# Patient Record
Sex: FEMALE | Race: WHITE | NOT HISPANIC OR LATINO | Employment: OTHER | URBAN - METROPOLITAN AREA
[De-identification: names, ages, dates, MRNs, and addresses within clinical notes are randomized per-mention and may not be internally consistent; named-entity substitution may affect disease eponyms.]

---

## 2017-07-18 ENCOUNTER — LAB VISIT (OUTPATIENT)
Dept: LAB | Facility: HOSPITAL | Age: 62
End: 2017-07-18
Attending: INTERNAL MEDICINE
Payer: COMMERCIAL

## 2017-07-18 DIAGNOSIS — C50.919 MALIGNANT NEOPLASM OF FEMALE BREAST: ICD-10-CM

## 2017-07-18 LAB
ALBUMIN SERPL BCP-MCNC: 4.2 G/DL
ALP SERPL-CCNC: 59 U/L
ALT SERPL W/O P-5'-P-CCNC: 34 U/L
ANION GAP SERPL CALC-SCNC: 10 MMOL/L
AST SERPL-CCNC: 31 U/L
BASOPHILS # BLD AUTO: 0.03 K/UL
BASOPHILS NFR BLD: 0.4 %
BILIRUB SERPL-MCNC: 0.7 MG/DL
BUN SERPL-MCNC: 20 MG/DL
CALCIUM SERPL-MCNC: 10 MG/DL
CHLORIDE SERPL-SCNC: 106 MMOL/L
CO2 SERPL-SCNC: 26 MMOL/L
CREAT SERPL-MCNC: 0.75 MG/DL
DIFFERENTIAL METHOD: NORMAL
EOSINOPHIL # BLD AUTO: 0.2 K/UL
EOSINOPHIL NFR BLD: 2.6 %
ERYTHROCYTE [DISTWIDTH] IN BLOOD BY AUTOMATED COUNT: 13.5 %
EST. GFR  (AFRICAN AMERICAN): >60 ML/MIN/1.73 M^2
EST. GFR  (NON AFRICAN AMERICAN): >60 ML/MIN/1.73 M^2
GLUCOSE SERPL-MCNC: 126 MG/DL
HCT VFR BLD AUTO: 42.6 %
HGB BLD-MCNC: 14.2 G/DL
LYMPHOCYTES # BLD AUTO: 2.4 K/UL
LYMPHOCYTES NFR BLD: 31.4 %
MAGNESIUM SERPL-MCNC: 2 MG/DL
MCH RBC QN AUTO: 31 PG
MCHC RBC AUTO-ENTMCNC: 33.3 %
MCV RBC AUTO: 93 FL
MONOCYTES # BLD AUTO: 0.7 K/UL
MONOCYTES NFR BLD: 8.4 %
NEUTROPHILS # BLD AUTO: 4.4 K/UL
NEUTROPHILS NFR BLD: 57.2 %
NRBC BLD-RTO: 0 /100 WBC
PLATELET # BLD AUTO: 245 K/UL
PMV BLD AUTO: 10 FL
POTASSIUM SERPL-SCNC: 4.1 MMOL/L
PROT SERPL-MCNC: 7.1 G/DL
RBC # BLD AUTO: 4.58 M/UL
SODIUM SERPL-SCNC: 142 MMOL/L
WBC # BLD AUTO: 7.73 K/UL

## 2017-07-18 PROCEDURE — 80053 COMPREHEN METABOLIC PANEL: CPT | Mod: PN

## 2017-07-18 PROCEDURE — 85025 COMPLETE CBC W/AUTO DIFF WBC: CPT | Mod: PN

## 2017-07-18 PROCEDURE — 85025 COMPLETE CBC W/AUTO DIFF WBC: CPT

## 2017-07-18 PROCEDURE — 83735 ASSAY OF MAGNESIUM: CPT | Mod: PN

## 2017-07-18 PROCEDURE — 83735 ASSAY OF MAGNESIUM: CPT

## 2017-07-18 PROCEDURE — 80053 COMPREHEN METABOLIC PANEL: CPT

## 2017-07-18 PROCEDURE — 36415 COLL VENOUS BLD VENIPUNCTURE: CPT | Mod: PN

## 2017-08-01 ENCOUNTER — OFFICE VISIT (OUTPATIENT)
Dept: OPTOMETRY | Facility: CLINIC | Age: 62
End: 2017-08-01
Payer: COMMERCIAL

## 2017-08-01 DIAGNOSIS — Z46.0 CONTACT LENS/GLASSES FITTING: ICD-10-CM

## 2017-08-01 DIAGNOSIS — H52.13 MYOPIA WITH ASTIGMATISM AND PRESBYOPIA, BILATERAL: ICD-10-CM

## 2017-08-01 DIAGNOSIS — Z46.0 CONTACT LENS/GLASSES FITTING: Primary | ICD-10-CM

## 2017-08-01 DIAGNOSIS — G43.109 OCULAR MIGRAINE: ICD-10-CM

## 2017-08-01 DIAGNOSIS — Z13.5 GLAUCOMA SCREENING: ICD-10-CM

## 2017-08-01 DIAGNOSIS — H43.393 VITREOUS FLOATERS, BILATERAL: ICD-10-CM

## 2017-08-01 DIAGNOSIS — H52.203 MYOPIA WITH ASTIGMATISM AND PRESBYOPIA, BILATERAL: ICD-10-CM

## 2017-08-01 DIAGNOSIS — H52.4 MYOPIA WITH ASTIGMATISM AND PRESBYOPIA, BILATERAL: ICD-10-CM

## 2017-08-01 DIAGNOSIS — H17.9 CORNEAL SCAR, LEFT EYE: ICD-10-CM

## 2017-08-01 DIAGNOSIS — E11.9 DIABETES MELLITUS TYPE 2 WITHOUT RETINOPATHY: Primary | ICD-10-CM

## 2017-08-01 PROCEDURE — 99499 UNLISTED E&M SERVICE: CPT | Mod: S$GLB,,, | Performed by: OPTOMETRIST

## 2017-08-01 PROCEDURE — 92310 CONTACT LENS FITTING OU: CPT | Mod: ,,, | Performed by: OPTOMETRIST

## 2017-08-01 PROCEDURE — 99999 PR PBB SHADOW E&M-EST. PATIENT-LVL II: CPT | Mod: PBBFAC,,, | Performed by: OPTOMETRIST

## 2017-08-01 PROCEDURE — 92015 DETERMINE REFRACTIVE STATE: CPT | Mod: S$GLB,,, | Performed by: OPTOMETRIST

## 2017-08-01 PROCEDURE — 92014 COMPRE OPH EXAM EST PT 1/>: CPT | Mod: S$GLB,,, | Performed by: OPTOMETRIST

## 2017-08-01 NOTE — PATIENT INSTRUCTIONS
DIABETES AND THE EYE / DIABETIC RETINOPATHY    Diabetic retinopathy is a condition occurring in persons with diabetes, which causes progressive damage to the retina, the light sensitive lining at the back of the eye. It is a serious sight-threatening complication of diabetes.    Diabetic retinopathy is the result of damage to the tiny blood vessels that nourish the retina. They leak blood and other fluids that cause swelling of retinal tissue and clouding of vision. The condition usually affects both eyes. The longer a person has diabetes, the more likely they will develop diabetic retinopathy. If left untreated, diabetic retinopathy can cause blindness.  There are two basic types of diabetic retinopathy:    Background or nonproliferative diabetic retinopathy (NPDR)  Nonproliferative diabetic retinopathy (NPDR) is the earliest stage of diabetic retinopathy. With this condition, damaged blood vessels in the retina begin to leak extra fluid and small amounts of blood into the eye. Sometimes, deposits of cholesterol or other fats from the blood may leak into the retina. Many people with diabetes have mild NPDR, which usually does not affect their vision. However, if their vision is affected, it is the result of macular edema and macular ischemia.    If vision is affected due to macular changes, a consult with a Retina Specialist may be advised.  This is an ophthalmologist that treats retina conditions, including diabetic retinopathy.     Proliferative diabetic retinopathy (PDR)  Proliferative diabetic retinopathy (PDR) mainly occurs when many of the blood vessels in the retina close, preventing enough blood flow. In an attempt to supply blood to the area where the original vessels closed, the retina responds by growing new blood vessels. This is called neovascularization. However, these new blood vessels are abnormal and do not supply the retina with proper blood flow. The new vessels are also often accompanied by scar  "tissue that may cause the retina to wrinkle or detach. PDR may cause more severe vision loss than NPDR because it can affect both central and peripheral vision.     A patient diagnosed with proliferative diabetic eye disease will be referred to a retinal specialist for consultation.    Often there are no visual symptoms in the early stages of diabetic retinopathy. That is why our eye care professionals recommend that everyone with diabetes have a comprehensive dilated eye examination once a year. Early detection and treatment can limit the potential for significant vision loss from diabetic retinopathy.        FLASHES / FLOATERS / POSTERIOR VITREOUS DETACHMENT    Call the clinic if you have any further changes in symptoms.  Including:  Increased numbers of floaters or flashing lights, dimness or darkness that moves through or stays constant in your vision, or any pain in the eye (s).            Early Cataracts--not visually significant for surgery consultation.    What Are Cataracts?  A clear lens in the eye focuses light. This lets the eye see images sharply. With age, the lens slowly becomes cloudy. The cloudy lens is a cataract. A cataract scatters light and makes it hard for the eye to focus. Cataracts often form in both eyes. But one lens may cloud faster than the other.      The Aging of Your Lens    Your lens may cloud so slowly that you don`t notice any vision changes at first. But as the cataract gets worse, the eye has a harder time focusing. In early stages, glasses may help you see better. As the lens gets cloudier, your doctor may recommend surgery to restore your vision.  DRY EYES:  Use Over The Counter artificial tears as needed for dry eye symptoms.  Some common brands include:  Systane, Optive, and Refresh.  These drops can be used as frequently as desired, but may be most helpful use during long periods of concentrated work.  For example, reading / working at the computer.  Avoid drops that "get " "redness out", as these contain medication that may further irritate the eyes.    ALLERGY EYES / SYMPTOMS:    Over the counter medications include--Zaditor and Alaway  Use as directed 1-2 drops daily for symptoms of itching / watering eyes.  These drops will not help for dry eye or exposure symptoms.      "

## 2017-08-01 NOTE — PROGRESS NOTES
Assessment /Plan     For exam results, see Encounter Report.    Contact lens/glasses fitting      Fitting fees only--see exam notes this date

## 2017-08-01 NOTE — PROGRESS NOTES
HPI     Annual Exam    Additional comments: DLE 7-16 (sheila)    needs updated specs & clrx           Diabetic Eye Exam    Additional comments: BSL controlled           Blurred Vision    Additional comments: at both near & distance           Eye Problem    Additional comments: 2 episodes of OU red, irritated, pain, discharge x 2   months ago --- was seen at [facility] & treated w/ antibiotic gtts           Headache    Additional comments: ocular migraines w/ auras           Comments   Hemoglobin A1C       Date                     Value               Ref Range             Status                06/29/2015               5.9 (H)             0.0 - 5.6 %           Final              Comment:    Reference Interval:_5.0 - 5.6 Normal  5.7 - 6.4 High Risk  > 6.5   Diabetic  Hgb A1c results are standardized based on the (NGSP)   National  Glycohemoglobin Standardization Program.  Hemoglobin A1C levels   are related to mean serum/plasma glucose during the  preceding 2-3   months.    ----------    tx for K ulcer at [facility] in May 17  Repeated once again since then  Had taken a break from the cl's  Still w/ am mucus and irritation, OU equal  occ oc migraine         Last edited by MAC Lai, OD on 8/1/2017  8:54 AM. (History)        ROS     Positive for: Eyes    Negative for: Constitutional, Gastrointestinal, Neurological, Skin,   Genitourinary, Musculoskeletal, HENT, Endocrine, Cardiovascular,   Respiratory, Psychiatric, Allergic/Imm, Heme/Lymph    Last edited by MAC Lai, OD on 8/1/2017  8:29 AM. (History)        Assessment /Plan     For exam results, see Encounter Report.    Diabetes mellitus type 2 without retinopathy    Vitreous floaters, bilateral    Ocular migraine    Glaucoma screening    Corneal scar, left eye    Myopia with astigmatism and presbyopia, bilateral    Contact lens/glasses fitting      1. No ret/ no csme, gave info, control glucose, annual DFE  2. RD precautions given  3. Longstanding, monitor  for new / different s/s  4. Not suspect  5. Not vis si small SEI  Scars from recent cl related issues  Add ATs or rewetting gtts, monitor for redness or new symptoms  6. Updated specs rx, gave copy  7. Updated clrx, gave new trials   Knows to message / call if prefers    DAILY WEAR CONTACT LENSES  Continue with Daily Wear of contact lenses, up to all waking hours.  Continue with approved contact lens disinfection / rewetting drops as discussed.  Dispose of lenses monthly.  Stop wearing your lenses and call our office if redness, blurred vision, or pain persists more than 12 hours.  We recommend an annual eye exam for contact lens patients.      Discussed and educated patient on current findings /plan.  RTC 1 year, prn if any changes / issues

## 2017-08-04 ENCOUNTER — TELEPHONE (OUTPATIENT)
Dept: OPTOMETRY | Facility: CLINIC | Age: 62
End: 2017-08-04

## 2017-08-04 NOTE — TELEPHONE ENCOUNTER
----- Message from MAC Lai, OD sent at 8/4/2017  3:53 PM CDT -----  Contact: [phone]  I have to order trials of stronger distance ---note sent to [facility]  This may make the near vision worse.      ----- Message -----  From: Esme Doss  Sent: 8/4/2017   3:42 PM  To: MAC Lai OD        ----- Message -----  From: Augusta Durham  Sent: 8/4/2017   2:01 PM  To: Joelle Santos Staff    Patient is requesting a call back from the nurse stated she want to try steronger contact to see far away.  Please call the patient upon request at phone number [phone].

## 2018-06-05 ENCOUNTER — LAB VISIT (OUTPATIENT)
Dept: LAB | Facility: HOSPITAL | Age: 63
End: 2018-06-05
Attending: INTERNAL MEDICINE
Payer: COMMERCIAL

## 2018-06-05 DIAGNOSIS — C50.919 BREAST CA: ICD-10-CM

## 2018-06-05 PROBLEM — Z78.0 POSTMENOPAUSAL: Status: ACTIVE | Noted: 2018-06-05

## 2018-06-05 LAB
ALBUMIN SERPL BCP-MCNC: 4 G/DL
ALP SERPL-CCNC: 56 U/L
ALT SERPL W/O P-5'-P-CCNC: 49 U/L
ANION GAP SERPL CALC-SCNC: 12 MMOL/L
AST SERPL-CCNC: 34 U/L
BASOPHILS # BLD AUTO: 0.05 K/UL
BASOPHILS NFR BLD: 0.6 %
BILIRUB SERPL-MCNC: 0.8 MG/DL
BUN SERPL-MCNC: 23 MG/DL
CALCIUM SERPL-MCNC: 10.2 MG/DL
CANCER AG15-3 SERPL-ACNC: 17.4 U/ML
CEA SERPL-MCNC: 0.9 NG/ML
CHLORIDE SERPL-SCNC: 102 MMOL/L
CO2 SERPL-SCNC: 26 MMOL/L
CREAT SERPL-MCNC: 0.72 MG/DL
DIFFERENTIAL METHOD: NORMAL
EOSINOPHIL # BLD AUTO: 0.1 K/UL
EOSINOPHIL NFR BLD: 1.4 %
ERYTHROCYTE [DISTWIDTH] IN BLOOD BY AUTOMATED COUNT: 13.6 %
EST. GFR  (AFRICAN AMERICAN): >60 ML/MIN/1.73 M^2
EST. GFR  (NON AFRICAN AMERICAN): >60 ML/MIN/1.73 M^2
GLUCOSE SERPL-MCNC: 177 MG/DL
HCT VFR BLD AUTO: 41.5 %
HGB BLD-MCNC: 14 G/DL
LYMPHOCYTES # BLD AUTO: 2.8 K/UL
LYMPHOCYTES NFR BLD: 32.5 %
MCH RBC QN AUTO: 30.7 PG
MCHC RBC AUTO-ENTMCNC: 33.7 G/DL
MCV RBC AUTO: 91 FL
MONOCYTES # BLD AUTO: 0.7 K/UL
MONOCYTES NFR BLD: 7.6 %
NEUTROPHILS # BLD AUTO: 5 K/UL
NEUTROPHILS NFR BLD: 57.9 %
NRBC BLD-RTO: 0 /100 WBC
PLATELET # BLD AUTO: 269 K/UL
PMV BLD AUTO: 9.9 FL
POTASSIUM SERPL-SCNC: 4.1 MMOL/L
PROT SERPL-MCNC: 7.1 G/DL
RBC # BLD AUTO: 4.56 M/UL
SODIUM SERPL-SCNC: 140 MMOL/L
WBC # BLD AUTO: 8.68 K/UL

## 2018-06-05 PROCEDURE — 86300 IMMUNOASSAY TUMOR CA 15-3: CPT | Mod: PN

## 2018-06-05 PROCEDURE — 86300 IMMUNOASSAY TUMOR CA 15-3: CPT

## 2018-06-05 PROCEDURE — 36415 COLL VENOUS BLD VENIPUNCTURE: CPT | Mod: PN

## 2018-06-05 PROCEDURE — 80053 COMPREHEN METABOLIC PANEL: CPT | Mod: PN

## 2018-06-05 PROCEDURE — 86300 IMMUNOASSAY TUMOR CA 15-3: CPT | Mod: 91

## 2018-06-05 PROCEDURE — 82378 CARCINOEMBRYONIC ANTIGEN: CPT

## 2018-06-05 PROCEDURE — 82378 CARCINOEMBRYONIC ANTIGEN: CPT | Mod: PN

## 2018-06-05 PROCEDURE — 85025 COMPLETE CBC W/AUTO DIFF WBC: CPT

## 2018-06-05 PROCEDURE — 85025 COMPLETE CBC W/AUTO DIFF WBC: CPT | Mod: PN

## 2018-06-05 PROCEDURE — 80053 COMPREHEN METABOLIC PANEL: CPT

## 2018-06-07 LAB — CANCER AG27-29 SERPL-ACNC: 20.8 U/ML

## 2019-03-28 ENCOUNTER — TELEPHONE (OUTPATIENT)
Dept: FAMILY MEDICINE | Facility: CLINIC | Age: 64
End: 2019-03-28

## 2019-03-28 NOTE — TELEPHONE ENCOUNTER
----- Message from Avelina Villavicencio sent at 3/28/2019  3:28 PM CDT -----  Contact: Alexander María Elena (Spouse)  Type:  Sooner Apoointment Request    Caller is requesting a sooner appointment.  Caller declined first available appointment listed below.  Caller will not accept being placed on the waitlist and is requesting a message be sent to doctor.    Name of Caller:  Alexander Ring (Spouse)  When is the first available appointment?  04/15/2019  Symptoms:  Sinus, sick, coughing  Best Call Back Number:  [phone] or [phone]   Additional Information:  Would like for someone to give a call back because the patient would like to be seen tomorrow. Please advise

## 2019-03-29 ENCOUNTER — OFFICE VISIT (OUTPATIENT)
Dept: FAMILY MEDICINE | Facility: CLINIC | Age: 64
End: 2019-03-29
Payer: COMMERCIAL

## 2019-03-29 VITALS
HEIGHT: 63 IN | SYSTOLIC BLOOD PRESSURE: 112 MMHG | TEMPERATURE: 98 F | BODY MASS INDEX: 22.73 KG/M2 | OXYGEN SATURATION: 98 % | WEIGHT: 128.31 LBS | HEART RATE: 80 BPM | DIASTOLIC BLOOD PRESSURE: 84 MMHG

## 2019-03-29 DIAGNOSIS — J06.9 UPPER RESPIRATORY TRACT INFECTION, UNSPECIFIED TYPE: Primary | ICD-10-CM

## 2019-03-29 PROCEDURE — 99999 PR PBB SHADOW E&M-EST. PATIENT-LVL IV: CPT | Mod: PBBFAC,,, | Performed by: NURSE PRACTITIONER

## 2019-03-29 PROCEDURE — 99999 PR PBB SHADOW E&M-EST. PATIENT-LVL IV: ICD-10-PCS | Mod: PBBFAC,,, | Performed by: NURSE PRACTITIONER

## 2019-03-29 PROCEDURE — 3008F PR BODY MASS INDEX (BMI) DOCUMENTED: ICD-10-PCS | Mod: CPTII,S$GLB,, | Performed by: NURSE PRACTITIONER

## 2019-03-29 PROCEDURE — 3079F DIAST BP 80-89 MM HG: CPT | Mod: CPTII,S$GLB,, | Performed by: NURSE PRACTITIONER

## 2019-03-29 PROCEDURE — 99214 PR OFFICE/OUTPT VISIT, EST, LEVL IV, 30-39 MIN: ICD-10-PCS | Mod: S$GLB,,, | Performed by: NURSE PRACTITIONER

## 2019-03-29 PROCEDURE — 99214 OFFICE O/P EST MOD 30 MIN: CPT | Mod: S$GLB,,, | Performed by: NURSE PRACTITIONER

## 2019-03-29 PROCEDURE — 3079F PR MOST RECENT DIASTOLIC BLOOD PRESSURE 80-89 MM HG: ICD-10-PCS | Mod: CPTII,S$GLB,, | Performed by: NURSE PRACTITIONER

## 2019-03-29 PROCEDURE — 3074F PR MOST RECENT SYSTOLIC BLOOD PRESSURE < 130 MM HG: ICD-10-PCS | Mod: CPTII,S$GLB,, | Performed by: NURSE PRACTITIONER

## 2019-03-29 PROCEDURE — 3008F BODY MASS INDEX DOCD: CPT | Mod: CPTII,S$GLB,, | Performed by: NURSE PRACTITIONER

## 2019-03-29 PROCEDURE — 3074F SYST BP LT 130 MM HG: CPT | Mod: CPTII,S$GLB,, | Performed by: NURSE PRACTITIONER

## 2019-03-29 RX ORDER — PAROXETINE 30 MG/1
TABLET, FILM COATED ORAL
COMMUNITY
Start: 2019-01-29

## 2019-03-29 RX ORDER — ATENOLOL 50 MG/1
TABLET ORAL
COMMUNITY
Start: 2019-02-06 | End: 2021-03-04 | Stop reason: SDUPTHER

## 2019-03-29 RX ORDER — PROMETHAZINE HYDROCHLORIDE AND DEXTROMETHORPHAN HYDROBROMIDE 6.25; 15 MG/5ML; MG/5ML
5 SYRUP ORAL EVERY 6 HOURS PRN
Qty: 180 ML | Refills: 0 | Status: SHIPPED | OUTPATIENT
Start: 2019-03-29 | End: 2020-06-18

## 2019-03-29 NOTE — PROGRESS NOTES
This dictation has been generated using Modal Fluency Dictation some phonetic errors may occur. Please contact author for clarification if needed.     Problem List Items Addressed This Visit     None      Visit Diagnoses     Upper respiratory tract infection, unspecified type    -  Primary          Orders Placed This Encounter    promethazine-dextromethorphan (PROMETHAZINE-DM) 6.25-15 mg/5 mL Syrp     Patient Instructions   Zyrtec(cetirizine) for runny nose, post nasal drip and congestion. Take twice a day for 3 days then daily in the morning.   Delsym(Dextromethorphan) daily in the morning for cough.       Cough URI.  No evidence of sinusitis.  No evidence of strep pharyngitis.  No influenza.  Treat with meds as above.    Follow up if symptoms worsen or fail to improve.    ________________________________________________________________  ________________________________________________________________      Chief Complaint   Patient presents with    Sinus Problem     head congestion, ear congestion     History of present illness  This 63 y.o. presents today for complaint of sore throat postnasal drip in fatigue.  Patient notes symptoms started Monday.  She took a cough med that she has a prescription for but unsure of name.  It appears that the last 1 in the system was promethazine DM.   is not currently ill.  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  Review of systems  Patient denies fever she notes chills no body aches  She has had some sinus pressure but no pain  Patient notes cough nonproductive.  She has a lot of throat clearing.  No shortness of breath  No chest pain or palpitations  No nausea vomiting or diarrhea    Past medical and social history reviewed.  Patient new to me.  Follows with the clinic.    Past Medical History:   Diagnosis Date    Breast cancer     Diabetes mellitus     Early    Hypertension        Past Surgical History:   Procedure Laterality Date     BREAST RECONSTRUCTION      x2    HYSTERECTOMY      MANDIBLE SURGERY      MASTECTOMY, RADICAL         Family History   Problem Relation Age of Onset    Cancer Mother     Parkinsonism Father     Cataracts Father     Diabetes Paternal Uncle        Social History     Socioeconomic History    Marital status:      Spouse name: None    Number of children: None    Years of education: None    Highest education level: None   Occupational History    None   Social Needs    Financial resource strain: None    Food insecurity:     Worry: None     Inability: None    Transportation needs:     Medical: None     Non-medical: None   Tobacco Use    Smoking status: Never Smoker   Substance and Sexual Activity    Alcohol use: No    Drug use: No    Sexual activity: None   Lifestyle    Physical activity:     Days per week: None     Minutes per session: None    Stress: None   Relationships    Social connections:     Talks on phone: None     Gets together: None     Attends Jainism service: None     Active member of club or organization: None     Attends meetings of clubs or organizations: None     Relationship status: None    Intimate partner violence:     Fear of current or ex partner: None     Emotionally abused: None     Physically abused: None     Forced sexual activity: None   Other Topics Concern    None   Social History Narrative    None       Current Outpatient Medications   Medication Sig Dispense Refill    atenolol (TENORMIN) 50 MG tablet       calcium carbonate (OS-DARI) 500 mg calcium (1,250 mg) tablet Take 2 tablets by mouth once daily.      cholecalciferol, vitamin D3, (VITAMIN D3) 2,000 unit Cap Take 1 capsule by mouth once daily.      cranberry 400 mg Cap Take 1,000 mg by mouth once daily.      INVOKANA 100 mg Tab       lisinopril 10 MG tablet TAKE 1 TABLET BY MOUTH TWICE DAILY FOR HIGH BLOOD PRESSURE 180 tablet 3    MULTIVITAMIN W-MINERALS/LUTEIN (CENTRUM SILVER ORAL) Take 1 tablet by  mouth once daily.      omega-3 fatty acids-vitamin E (FISH OIL) 1,000 mg Cap Take 2 tablets by mouth before breakfast.      paroxetine (PAXIL) 30 MG tablet       phenazopyridine (PYRIDIUM) 200 MG tablet Take 200 mg by mouth 3 (three) times daily as needed for Pain.      promethazine-dextromethorphan (PROMETHAZINE-DM) 6.25-15 mg/5 mL Syrp Take 5 mLs by mouth every 6 (six) hours as needed (night time cough). 180 mL 0     No current facility-administered medications for this visit.        Review of patient's allergies indicates:   Allergen Reactions    Demerol (pf) [meperidine (pf)]     Ibuprofen     Naprosyn [naproxen] Rash       Physical examination  Vitals Reviewed  Gen. Well-dressed well-nourished   Skin warm dry and intact.  No rashes noted.  HEENT.  TM intact bilateral with normal light reflex.  No mastoid tenderness during percussion.  Nares patent bilateral.  Pharynx with remarkable postnasal drip.  No erythematous changes or exudate noted in the pharynx..  No maxillary or frontal sinus tenderness when percussed.    Neck is supple without adenopathy  Chest.  Respirations are even unlabored.  Lungs are clear to auscultation.  Cardiac regular rate and rhythm.  No chest wall adenopathy noted.  Neuro. Awake alert oriented x4.  Normal judgment and cognition noted.  Extremities no clubbing cyanosis or edema noted.     Call or return to clinic prn if these symptoms worsen or fail to improve as anticipated.

## 2019-03-29 NOTE — PATIENT INSTRUCTIONS
Zyrtec(cetirizine) for runny nose, post nasal drip and congestion. Take twice a day for 3 days then daily in the morning.   Delsym(Dextromethorphan) daily in the morning for cough.

## 2019-04-05 ENCOUNTER — TELEPHONE (OUTPATIENT)
Dept: FAMILY MEDICINE | Facility: CLINIC | Age: 64
End: 2019-04-05

## 2019-04-05 RX ORDER — AMOXICILLIN AND CLAVULANATE POTASSIUM 875; 125 MG/1; MG/1
1 TABLET, FILM COATED ORAL EVERY 12 HOURS
Qty: 20 TABLET | Refills: 0 | Status: SHIPPED | OUTPATIENT
Start: 2019-04-05 | End: 2019-04-15

## 2019-04-05 NOTE — TELEPHONE ENCOUNTER
Continue Zyrtec daily  She notes that at [facility]  Given the ear symptoms she needs a decongestant.  I recommend 30 mg of Sudafed up to every 6 hr for congestion symptoms.  I will add an antibiotic at this time due to the persistence of her symptoms however other therapies as above are needed.

## 2019-04-05 NOTE — TELEPHONE ENCOUNTER
----- Message from Avelina Villavicencio sent at 4/5/2019  4:14 PM CDT -----  Contact: patient  Type: Needs Medical Advice    Who Called:  patient  Best Call Back Number: [phone]  Additional Information: states that she is still waiting on a call from the nurse. Please give call back.

## 2019-04-05 NOTE — TELEPHONE ENCOUNTER
----- Message from Ashleymariza Marcelinobelkis sent at 4/5/2019  1:22 PM CDT -----  Contact: Self  Patient saw Atilio last Friday for her sinuses, he didn't give her an antibiotic just cough syrup and get over the counter medicine for allergies.  Now still having thick mucus in nose and still having ears crackle when she blows her nose and now her eyes are being affected even with a little discolored mucus coming out of them.  Ask if he can call in something to help her out and call patient back to advise at [phone] (home).  Thanks

## 2019-04-05 NOTE — TELEPHONE ENCOUNTER
Spoke w/ pt. Informed pt about recommendations per provider. pt verbalized understanding.    Pt advised of medication called in. Pt expressed understanding.

## 2019-09-18 ENCOUNTER — OFFICE VISIT (OUTPATIENT)
Dept: OPTOMETRY | Facility: CLINIC | Age: 64
End: 2019-09-18
Payer: COMMERCIAL

## 2019-09-18 ENCOUNTER — TELEPHONE (OUTPATIENT)
Dept: OPHTHALMOLOGY | Facility: CLINIC | Age: 64
End: 2019-09-18

## 2019-09-18 DIAGNOSIS — E11.9 DIABETES MELLITUS TYPE 2 WITHOUT RETINOPATHY: Primary | ICD-10-CM

## 2019-09-18 DIAGNOSIS — H52.13 MYOPIA WITH ASTIGMATISM AND PRESBYOPIA, BILATERAL: ICD-10-CM

## 2019-09-18 DIAGNOSIS — H43.393 VITREOUS FLOATERS, BILATERAL: ICD-10-CM

## 2019-09-18 DIAGNOSIS — H52.203 MYOPIA WITH ASTIGMATISM AND PRESBYOPIA, BILATERAL: ICD-10-CM

## 2019-09-18 DIAGNOSIS — Z46.0 CONTACT LENS/GLASSES FITTING: Primary | ICD-10-CM

## 2019-09-18 DIAGNOSIS — Z46.0 CONTACT LENS/GLASSES FITTING: ICD-10-CM

## 2019-09-18 DIAGNOSIS — H52.4 MYOPIA WITH ASTIGMATISM AND PRESBYOPIA, BILATERAL: ICD-10-CM

## 2019-09-18 DIAGNOSIS — Z13.5 GLAUCOMA SCREENING: ICD-10-CM

## 2019-09-18 PROCEDURE — 92014 COMPRE OPH EXAM EST PT 1/>: CPT | Mod: S$GLB,,, | Performed by: OPTOMETRIST

## 2019-09-18 PROCEDURE — 92014 PR EYE EXAM, EST PATIENT,COMPREHESV: ICD-10-PCS | Mod: S$GLB,,, | Performed by: OPTOMETRIST

## 2019-09-18 PROCEDURE — 99999 PR PBB SHADOW E&M-EST. PATIENT-LVL II: ICD-10-PCS | Mod: PBBFAC,,, | Performed by: OPTOMETRIST

## 2019-09-18 PROCEDURE — 99999 PR PBB SHADOW E&M-EST. PATIENT-LVL II: CPT | Mod: PBBFAC,,, | Performed by: OPTOMETRIST

## 2019-09-18 PROCEDURE — 92310 CONTACT LENS FITTING OU: CPT | Mod: CSM,,, | Performed by: OPTOMETRIST

## 2019-09-18 PROCEDURE — 99499 UNLISTED E&M SERVICE: CPT | Mod: S$GLB,,, | Performed by: OPTOMETRIST

## 2019-09-18 PROCEDURE — 92310 PR CONTACT LENS FITTING (NO CHANGE): ICD-10-PCS | Mod: CSM,,, | Performed by: OPTOMETRIST

## 2019-09-18 PROCEDURE — 92015 PR REFRACTION: ICD-10-PCS | Mod: S$GLB,,, | Performed by: OPTOMETRIST

## 2019-09-18 PROCEDURE — 92015 DETERMINE REFRACTIVE STATE: CPT | Mod: S$GLB,,, | Performed by: OPTOMETRIST

## 2019-09-18 PROCEDURE — 99499 NO LOS: ICD-10-PCS | Mod: S$GLB,,, | Performed by: OPTOMETRIST

## 2019-09-18 RX ORDER — SITAGLIPTIN 100 MG/1
TABLET, FILM COATED ORAL
Refills: 4 | COMMUNITY
Start: 2019-09-12 | End: 2021-12-28

## 2019-09-18 NOTE — PROGRESS NOTES
HPI     DLS- 8/1/17     Pt is here for routine diabetic eye exam with CL fit. Pt states no changes   since last seen. She was recently given another pill for her diabetes and   states since then her diabetes is under control. Visual migraines with   triple va. htn s controlled     Hemoglobin A1C       Date                     Value               Ref Range             Status                06/29/2015               5.9 (H)             0.0 - 5.6 %           Final                  Last edited by Eyal Cotto on 9/18/2019  1:25 PM. (History)        ROS     Positive for: Eyes    Negative for: Constitutional, Gastrointestinal, Neurological, Skin,   Genitourinary, Musculoskeletal, HENT, Endocrine, Cardiovascular,   Respiratory, Psychiatric, Allergic/Imm, Heme/Lymph    Last edited by MAC Lai, OD on 9/18/2019  1:37 PM. (History)        Assessment /Plan     For exam results, see Encounter Report.    Diabetes mellitus type 2 without retinopathy    Vitreous floaters, bilateral    Glaucoma screening    Myopia with astigmatism and presbyopia, bilateral    Contact lens/glasses fitting      1. No ret/ no csme, gave info, control glucose, annual DFE  2. RD precautions given  3. Not suspect  4.updated specs rx, gave copy  5.updated clrx, gave copy and optifree    DAILY WEAR CONTACT LENSES  Continue with Daily Wear of contact lenses, up to all waking hours.  Continue with approved contact lens disinfection / rewetting drops as discussed.  Dispose of lenses monthly.  Stop wearing your lenses and call our office if redness, blurred vision, or pain persists more than 12 hours.  We recommend an annual eye exam for contact lens patients.    Discussed and educated patient on current findings /plan.  RTC 1 year, prn if any changes / issues

## 2019-09-20 ENCOUNTER — NURSE TRIAGE (OUTPATIENT)
Dept: ADMINISTRATIVE | Facility: CLINIC | Age: 64
End: 2019-09-20

## 2019-09-20 NOTE — TELEPHONE ENCOUNTER
Patient states that she has been having back and shoulder pain that radiates to her chest and down her left arm; went to the ER Wednesday night. Patient was told to see an orthopedic. Pain 8/10. Patient advised to go to the office now; appointment made for 1:40 during the patient's lunch hour. Verbalized understanding.     Reason for Disposition   SEVERE pain (e.g., excruciating, unable to do any normal activities)    Additional Information   Negative: Shock suspected (e.g., cold/pale/clammy skin, too weak to stand, low BP, rapid pulse)   Negative: Similar pain previously and it was from 'heart attack'   Negative: Similar pain previously and it was from 'angina' and not relieved by nitroglycerin   Negative: Sounds like a life-threatening emergency to the triager   Negative: Difficulty breathing or unusual sweating (e.g., sweating without exertion)   Negative: Age > 40 and associated chest or jaw pain and pain lasting > 5 minutes   Negative: Age > 40 and no obvious cause and pain even when not moving the arm (Exception: pain is clearly made worse by moving arm or bending neck)   Negative: Red area or streak and fever   Negative: Swollen joint and fever   Negative: Entire arm is swollen   Negative: Patient sounds very sick or weak to the triager    Protocols used: ST SHOULDER PAIN-A-OH

## 2019-09-24 DIAGNOSIS — M25.512 LEFT SHOULDER PAIN, UNSPECIFIED CHRONICITY: Primary | ICD-10-CM

## 2019-09-25 ENCOUNTER — OFFICE VISIT (OUTPATIENT)
Dept: ORTHOPEDICS | Facility: CLINIC | Age: 64
End: 2019-09-25
Payer: COMMERCIAL

## 2019-09-25 ENCOUNTER — HOSPITAL ENCOUNTER (OUTPATIENT)
Dept: RADIOLOGY | Facility: HOSPITAL | Age: 64
Discharge: HOME | End: 2019-09-25
Attending: ORTHOPAEDIC SURGERY
Payer: COMMERCIAL

## 2019-09-25 VITALS
HEART RATE: 78 BPM | BODY MASS INDEX: 22.68 KG/M2 | DIASTOLIC BLOOD PRESSURE: 75 MMHG | HEIGHT: 63 IN | WEIGHT: 128 LBS | SYSTOLIC BLOOD PRESSURE: 143 MMHG

## 2019-09-25 DIAGNOSIS — M54.12 LEFT CERVICAL RADICULOPATHY: Primary | ICD-10-CM

## 2019-09-25 DIAGNOSIS — M25.512 LEFT SHOULDER PAIN, UNSPECIFIED CHRONICITY: ICD-10-CM

## 2019-09-25 PROCEDURE — 3077F PR MOST RECENT SYSTOLIC BLOOD PRESSURE >= 140 MM HG: ICD-10-PCS | Mod: CPTII,S$GLB,, | Performed by: ORTHOPAEDIC SURGERY

## 2019-09-25 PROCEDURE — 99203 OFFICE O/P NEW LOW 30 MIN: CPT | Mod: S$GLB,,, | Performed by: ORTHOPAEDIC SURGERY

## 2019-09-25 PROCEDURE — 3078F PR MOST RECENT DIASTOLIC BLOOD PRESSURE < 80 MM HG: ICD-10-PCS | Mod: CPTII,S$GLB,, | Performed by: ORTHOPAEDIC SURGERY

## 2019-09-25 PROCEDURE — 3077F SYST BP >= 140 MM HG: CPT | Mod: CPTII,S$GLB,, | Performed by: ORTHOPAEDIC SURGERY

## 2019-09-25 PROCEDURE — 99203 PR OFFICE/OUTPT VISIT, NEW, LEVL III, 30-44 MIN: ICD-10-PCS | Mod: S$GLB,,, | Performed by: ORTHOPAEDIC SURGERY

## 2019-09-25 PROCEDURE — 73030 X-RAY EXAM OF SHOULDER: CPT | Mod: TC,PO,LT

## 2019-09-25 PROCEDURE — 73030 X-RAY EXAM OF SHOULDER: CPT | Mod: 26,LT,, | Performed by: RADIOLOGY

## 2019-09-25 PROCEDURE — 3008F PR BODY MASS INDEX (BMI) DOCUMENTED: ICD-10-PCS | Mod: CPTII,S$GLB,, | Performed by: ORTHOPAEDIC SURGERY

## 2019-09-25 PROCEDURE — 99999 PR PBB SHADOW E&M-EST. PATIENT-LVL III: CPT | Mod: PBBFAC,,, | Performed by: ORTHOPAEDIC SURGERY

## 2019-09-25 PROCEDURE — 3078F DIAST BP <80 MM HG: CPT | Mod: CPTII,S$GLB,, | Performed by: ORTHOPAEDIC SURGERY

## 2019-09-25 PROCEDURE — 73030 XR SHOULDER TRAUMA 3 VIEW LEFT: ICD-10-PCS | Mod: 26,LT,, | Performed by: RADIOLOGY

## 2019-09-25 PROCEDURE — 3008F BODY MASS INDEX DOCD: CPT | Mod: CPTII,S$GLB,, | Performed by: ORTHOPAEDIC SURGERY

## 2019-09-25 PROCEDURE — 99999 PR PBB SHADOW E&M-EST. PATIENT-LVL III: ICD-10-PCS | Mod: PBBFAC,,, | Performed by: ORTHOPAEDIC SURGERY

## 2019-09-25 RX ORDER — METHYLPREDNISOLONE 4 MG/1
TABLET ORAL
Qty: 21 TABLET | Refills: 0 | Status: SHIPPED | OUTPATIENT
Start: 2019-09-25 | End: 2020-06-18

## 2019-09-25 RX ORDER — KETOROLAC TROMETHAMINE 10 MG/1
10 TABLET, FILM COATED ORAL EVERY 6 HOURS
COMMUNITY
End: 2020-06-18

## 2019-09-26 NOTE — PROGRESS NOTES
Past Medical History:   Diagnosis Date    Breast cancer     Diabetes mellitus     Early    Hypertension        Past Surgical History:   Procedure Laterality Date    BILATERAL MASTECTOMY      BREAST RECONSTRUCTION      x2    HYSTERECTOMY      MANDIBLE SURGERY      MASTECTOMY, RADICAL         Current Outpatient Medications   Medication Sig    atenolol (TENORMIN) 50 MG tablet     calcium carbonate (OS-DARI) 500 mg calcium (1,250 mg) tablet Take 2 tablets by mouth once daily.    cholecalciferol, vitamin D3, (VITAMIN D3) 2,000 unit Cap Take 1 capsule by mouth once daily.    cranberry 400 mg Cap Take 1,000 mg by mouth once daily.    INVOKANA 100 mg Tab     JANUVIA 100 mg Tab     ketorolac (TORADOL) 10 mg tablet Take 10 mg by mouth every 6 (six) hours.    lisinopril 10 MG tablet TAKE 1 TABLET BY MOUTH TWICE DAILY FOR HIGH BLOOD PRESSURE    MULTIVITAMIN W-MINERALS/LUTEIN (CENTRUM SILVER ORAL) Take 1 tablet by mouth once daily.    omega-3 fatty acids-vitamin E (FISH OIL) 1,000 mg Cap Take 2 tablets by mouth before breakfast.    paroxetine (PAXIL) 30 MG tablet     tiZANidine (ZANAFLEX) 4 MG tablet Take 1 tablet (4 mg total) by mouth every 6 (six) hours as needed.    traMADol (ULTRAM) 50 mg tablet Take 1 tablet (50 mg total) by mouth every 6 (six) hours as needed for Pain.    lidocaine (LIDODERM) 5 % Place 1 patch onto the skin once daily. Remove & Discard patch within 12 hours or as directed by MD (Patient not taking: Reported on 9/25/2019)    methylPREDNISolone (MEDROL DOSEPACK) 4 mg tablet use as directed    phenazopyridine (PYRIDIUM) 200 MG tablet Take 200 mg by mouth 3 (three) times daily as needed for Pain.    promethazine-dextromethorphan (PROMETHAZINE-DM) 6.25-15 mg/5 mL Syrp Take 5 mLs by mouth every 6 (six) hours as needed (night time cough). (Patient not taking: Reported on 9/25/2019)     No current facility-administered medications for this visit.        Review of patient's allergies  indicates:   Allergen Reactions    Meperidine Nausea And Vomiting and Nausea Only    Demerol (pf) [meperidine (pf)]     Ibuprofen     Naprosyn [naproxen] Rash       Family History   Problem Relation Age of Onset    Cancer Mother     Parkinsonism Father     Cataracts Father     Diabetes Paternal Uncle        Social History     Socioeconomic History    Marital status:      Spouse name: Not on file    Number of children: Not on file    Years of education: Not on file    Highest education level: Not on file   Occupational History    Not on file   Social Needs    Financial resource strain: Not on file    Food insecurity:     Worry: Not on file     Inability: Not on file    Transportation needs:     Medical: Not on file     Non-medical: Not on file   Tobacco Use    Smoking status: Never Smoker    Smokeless tobacco: Never Used   Substance and Sexual Activity    Alcohol use: No    Drug use: No    Sexual activity: Not on file   Lifestyle    Physical activity:     Days per week: Not on file     Minutes per session: Not on file    Stress: Not on file   Relationships    Social connections:     Talks on phone: Not on file     Gets together: Not on file     Attends Bahai service: Not on file     Active member of club or organization: Not on file     Attends meetings of clubs or organizations: Not on file     Relationship status: Not on file   Other Topics Concern    Not on file   Social History Narrative    Not on file       Chief Complaint:   Chief Complaint   Patient presents with    Left Shoulder - Pain       History of present illness:  This is a 63-year-old right-hand-dominant female seen for left shoulder pain that started about a week ago.  It will be 2 weeks this Sunday.  The patient was doing some moving and woke up Monday with significant pain up in her neck shoulder and down her arm all the way to her hand.  Patient has pain with movement as well as at rest.  She went to the  emergency room to make sure she was not having a heart attack.  Pain is an 8/10.  She has been taking Zanaflex Toradol and Ultram.      Review of Systems:    Constitution: Negative for chills, fever, and sweats.  Negative for unexplained weight loss.    HENT:  Negative for headaches and blurry vision.    Cardiovascular:Negative for chest pain or irregular heart beat. Negative for hypertension.    Respiratory:  Negative for cough and shortness of breath.    Gastrointestinal: Negative for abdominal pain, heartburn, melena, nausea, and vomitting.    Genitourinary:  Negative bladder incontinence and dysuria.    Musculoskeletal:  See HPI    Neurological: Negative for numbness.    Psychiatric/Behavioral: Negative for depression.  The patient is not nervous/anxious.      Endocrine: Negative for polyuria    Hematologic/Lymphatic: Negative for bleeding problem.  Does not bruise/bleed easily.    Skin: Negative for poor would healing and rash      Physical Examination:    Vital Signs:    Vitals:    09/25/19 1322   BP: (!) 143/75   Pulse: 78       Body mass index is 22.67 kg/m².    This a well-developed, well nourished patient in no acute distress.  They are alert and oriented and cooperative to examination.  Pt. walks without an antalgic gait.      Examination of bilateral shoulders shows no rashes or erythema. There are no masses, ecchymosis, or atrophy. The patient has full range of motion in forward flexion, external rotation, and internal rotation to the mid T-spine. The patient has - impingement signs. - Chester's test. - Speeds test. Nontender to palpation over a.c. joint. Normal stability anteriorly, posteriorly, and negative sulcus sign. Passive range of motion: Forward flexion of 180°, external rotation at 90° of 90°, internal rotation of 50°, and external rotation at 0° of 50°. 2+ radial pulse. Intact axillary, radial, median and ulnar sensation. 5 out of 5 resisted forward flexion, external rotation, and negative  lift off test.  Significant tightness in the trapezius on the left with some pain with range of motion of her neck      X-rays:  X-rays left shoulder ordered and reviewed which show some sclerosis of the greater tuberosity.  No acute fracture     Assessment::  Left neck pain with some cervical radiculopathy    Plan:  I reviewed the finding with her today. I think it is more related to her neck thin her shoulder itself.  I recommended a Medrol Dosepak instead of the Toradol.  I would like her to notify me if it is not improving.  She might need an MRI of her neck.    This note was created using [employer] voice recognition software that occasionally misinterpreted phrases or words.    Consult note is delivered via Epic messaging service.

## 2019-09-30 ENCOUNTER — TELEPHONE (OUTPATIENT)
Dept: ORTHOPEDICS | Facility: CLINIC | Age: 64
End: 2019-09-30

## 2019-09-30 DIAGNOSIS — M54.2 NECK PAIN: Primary | ICD-10-CM

## 2019-09-30 NOTE — TELEPHONE ENCOUNTER
----- Message from Belgica Estevez sent at 9/30/2019 11:02 AM CDT -----  Contact: pt [phone]  Patient called to say the pain has not ease up at all she has taken all the medication you gave her she is asking for a call back she can be reached at  [phone]

## 2019-09-30 NOTE — TELEPHONE ENCOUNTER
Called pt and scheduled mri per Dr. Laws note since medication is not helping. Pt verbalized understanding.

## 2019-10-04 ENCOUNTER — HOSPITAL ENCOUNTER (OUTPATIENT)
Dept: RADIOLOGY | Facility: HOSPITAL | Age: 64
Discharge: HOME | End: 2019-10-04
Attending: ORTHOPAEDIC SURGERY
Payer: COMMERCIAL

## 2019-10-04 ENCOUNTER — TELEPHONE (OUTPATIENT)
Dept: ORTHOPEDICS | Facility: CLINIC | Age: 64
End: 2019-10-04

## 2019-10-04 DIAGNOSIS — M54.2 NECK PAIN: ICD-10-CM

## 2019-10-04 PROCEDURE — 72141 MRI NECK SPINE W/O DYE: CPT | Mod: TC

## 2019-10-04 NOTE — TELEPHONE ENCOUNTER
----- Message from Dana Decker sent at 10/4/2019 12:58 PM CDT -----  Type:  Test Results    Who Called:  Patient  Name of Test (Lab/Mammo/Etc):  MRI  Date of Test:  10/04/2019  Ordering Provider:  Dr. aRmiro Laws  Where the test was performed:  [facility]  Best Call Back Number:  [phone]  Additional Information:

## 2019-10-04 NOTE — TELEPHONE ENCOUNTER
Patient had MRI of the neck done today and she was inquiring about the results. 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 She also wants to know if the results just mean she would need PT.    Thanks

## 2019-10-07 ENCOUNTER — TELEPHONE (OUTPATIENT)
Dept: ORTHOPEDICS | Facility: CLINIC | Age: 64
End: 2019-10-07

## 2019-10-07 DIAGNOSIS — M54.12 LEFT CERVICAL RADICULOPATHY: Primary | ICD-10-CM

## 2019-10-07 DIAGNOSIS — M54.2 NECK PAIN: ICD-10-CM

## 2019-10-07 NOTE — TELEPHONE ENCOUNTER
Called and advised patient per Dr. Laws. She is going to contact us back with how she would like to proceed.

## 2019-10-07 NOTE — TELEPHONE ENCOUNTER
Called and spoke with patient and she wishes to start with PT. Orders entered into the system, number provided to patient to schedule.

## 2019-10-07 NOTE — TELEPHONE ENCOUNTER
----- Message from Dinesh Villarreal sent at 10/7/2019  2:16 PM CDT -----  Contact: same  Patient called in and stated she spoke to nurse a little while ago about her test results and has some more questions.  Patient call back number is [phone])

## 2019-10-09 ENCOUNTER — CLINICAL SUPPORT (OUTPATIENT)
Dept: REHABILITATION | Facility: HOSPITAL | Age: 64
End: 2019-10-09
Attending: ORTHOPAEDIC SURGERY
Payer: COMMERCIAL

## 2019-10-09 DIAGNOSIS — M54.12 CERVICAL RADICULAR PAIN: ICD-10-CM

## 2019-10-09 DIAGNOSIS — R29.898 DECREASED ROM OF NECK: ICD-10-CM

## 2019-10-09 PROCEDURE — 97161 PT EVAL LOW COMPLEX 20 MIN: CPT | Mod: PO | Performed by: PHYSICAL THERAPIST

## 2019-10-09 PROCEDURE — 97012 MECHANICAL TRACTION THERAPY: CPT | Mod: PO | Performed by: PHYSICAL THERAPIST

## 2019-10-09 NOTE — PLAN OF CARE
[facility]  Physical Therapy Initial Evaluation    Name: Benji Ring  Clinic Number: [id number]    Therapy Diagnosis:   Encounter Diagnoses   Name Primary?    Cervical radicular pain     Decreased ROM of neck      Physician: Ramiro Laws,*    Physician Orders: PT Eval and Treat   Medical Diagnosis from Referral: left cervical radiculopathy  Evaluation Date: 10/9/2019  Authorization Period Expiration: 12/31/19  Plan of Care Expiration: 12/6/19  Visit # / Visits authorized: 1/ 25    Time In: 3:50PM  Time Out: 4:50PM  Total Billable Time: 60 minutes    Precautions: Standard    Subjective   Date of onset: 9/16/19  History of current condition - Benji reports: she awoke with sudden onset of neck and shoulder pain. As day went on pain radiated down left UE to wrist. Pain has been constant since then with no relief with pain meds, muscle relaxers, anti-inflammatory meds or steroid medication. Pain is disturbing her sleep. Driving increases pain as well. 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 She had breast cancer in 1999 and 2003 with bilateral mastectomy, reconstruction, chemotherapy and radiation. She currently sees oncologist 1x/year for blood work.     Medical History:   Past Medical History:   Diagnosis Date    Breast cancer     Diabetes mellitus     Early    Hypertension        Surgical History:   Benji Ring  has a past surgical history that includes Mastectomy, radical; Breast reconstruction; Mandible surgery; Hysterectomy; and Bilateral mastectomy.    Medications:   Benji has a current medication list which includes the following prescription(s): atenolol, calcium carbonate, cholecalciferol (vitamin d3), cranberry, invokana, januvia, ketorolac, lidocaine, lisinopril, methylprednisolone, folic acid/multivit-min/lutein, omega-3 fatty acids-vitamin e, paroxetine, phenazopyridine, promethazine-dextromethorphan, and  tramadol.    Allergies:   Review of patient's allergies indicates:   Allergen Reactions    Meperidine Nausea And Vomiting and Nausea Only    Demerol (pf) [meperidine (pf)]     Ibuprofen     Naprosyn [naproxen] Rash        Imaging, MRI studies:     Multilevel degenerative disc disease    Left foraminal disc bulge at C6-7 narrowing the left neural foramen.  Correlation with left C7 radicular symptoms is recommended.    Osteophytic disc complex at C4-5 resulting in bilateral foraminal narrowing, right greater than left.  Correlation with right C5 radicular symptoms is recommended    Osteophytic disc complex at C5-6 resulting in bilateral foraminal narrowing,      Prior Therapy: no  Social History:  lives with their spouse  Occupation:  with special ed,   Prior Level of Function: no difficulty with sleep, driving or household chores  Current Level of Function:pain interfering with sleep, driving, household activities.     Pain:  Current 8/10, worst 8/10, best 8/10   Location: left neck , shoulder  and upper arm and forearm   Description: Aching, Throbbing and Sharp  Aggravating Factors: Flexing  Easing Factors: nothing    Pts goals: To be painfree    Objective       Posture: Decreased cervical lordosis, left scapula elevated    Cervical Range of Motion:    Degrees Pain   Flexion 50 Yes+++     Extension 40 Yes+     Right Rotation 60 Yes+++     Left Rotation 70 Yes+     Right Side Bending 15 Yes+   Left Side Bending 15 Yes+      Shoulder Range of Motion:   Shoulder Left Right   Flexion 180 180   Abduction 175 175   ER 90 90   IR 85 85     Strength:  Upper Extremity Strength  (R) UE  (L) UE    Shoulder flexion: 5/5 Shoulder flexion: 5/5   Shoulder Abduction: 5/5 Shoulder abduction: 5/5   Shoulder ER 5/5 Shoulder ER 5/5   Shoulder IR 5/5 Shoulder IR 5/5   Elbow flexion: 5/5 Elbow flexion: 5/5   Elbow extension: 5/5 Elbow extension: 4/5   Wrist flexion: 5/5 Wrist flexion: 4/5   Wrist extension: 5/5  Wrist extension: 5/5    5/5 : 5/5   Lower Trap 4/5 Lower Trap 4-/5   Middle Trap 4/5 Middle Trap 4/5   Rhomboids 4/5 Rhomboids 4/5         Special Tests:  Distraction Decreased distal sx   Compression No change   Spurlings neg         Thoracic mobility: good    Palpation: tender and tight bilateral cervical and left upper trap musculature      Sensation: intact    Flexibility: tight pectorals and latissimus dorsi           CMS Impairment/Limitation/Restriction for FOTO cervical Survey    Therapist reviewed FOTO scores for Benji Ring on 10/9/2019.   FOTO documents entered into [employer] - see Media section.    Limitation Score: 61%       TREATMENT   Treatment Time In: 4:25PM  Treatment Time Out: 4:50PM  Total Treatment time separate from Evaluation: 30 minutes    Benji received therapeutic exercises to develop strength, ROM, flexibility and posture for 5 minutes including:  Cervical extension 3 sets of 5  Instruct in posture, sleep position    Benji received the following supervised modalities after being cleared for contradictions: Mechanical Traction:  Benji received intermittent mechanical traction to the cervical spine at a force of 15 pounds for a total of 15 minutes. Hold time of 30 seconds and rest time for 10  seconds. Patient tolerated treatment well without any adverse effects.    Home Exercises and Patient Education Provided    Education provided:   - Instructed in posture, sleep position and cervical extension 5-10x every hour    Written Home Exercises Provided: yes.  Exercises were reviewed and Benji was able to demonstrate them prior to the end of the session.  Benji demonstrated good  understanding of the education provided.     See EMR under Patient Instructions for exercises provided 10/9/2019.    Assessment   Benji is a 63 y.o. female referred to outpatient Physical Therapy with a medical diagnosis of left cervical radiculopathy. Pt presents with constant cervical and left upper  extremity pain. Sx increase with flexion and right rotation. Unable to decrease sx with cervical mobility testing, but introduced cervical extension without increase of sx. Decreased intensity of forearm pain with cervical traction. She has limited cervical ROM secondary to pain. 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     Pt prognosis is Excellent.   Pt will benefit from skilled outpatient Physical Therapy to address the deficits stated above and in the chart below, provide pt/family education, and to maximize pt's level of independence.     Plan of care discussed with patient: Yes  Pt's spiritual, cultural and educational needs considered and patient is agreeable to the plan of care and goals as stated below:     Anticipated Barriers for therapy: none    Medical Necessity is demonstrated by the following  History  Co-morbidities and personal factors that may impact the plan of care Co-morbidities:   difficulty sleeping and history of cancer    Personal Factors:   no deficits     low   Examination  Body Structures and Functions, activity limitations and participation restrictions that may impact the plan of care Body Regions:   neck  upper extremities    Body Systems:    gross symmetry  ROM  strength  gross coordinated movement    Participation Restrictions:   none    Activity limitations:   Learning and applying knowledge  no deficits    General Tasks and Commands  no deficits    Communication  no deficits    Mobility  lifting and carrying objects  driving (bike, car, motorcycle)    Self care  no deficits    Domestic Life  doing house work (cleaning house, washing dishes, laundry)    Interactions/Relationships  no deficits    Life Areas  no deficits    Community and Social Life  no deficits         low   Clinical Presentation stable and uncomplicated low   Decision Making/ Complexity Score: low     Goals:  Short Term Goals: 4 weeks   Pt will have centralization of sx not  radiating past upper arm and less than 2/10.  Pt will be able to flex and rotate cervical spine to right without increase of left UE sx.  Pt will be able to sleep through night awakening with decreased pain.    Long Term Goals: 8 weeks   Pt will have centralization of sx to neck only with pain less than 2/10.  Pt will be independent with HEP for sx management.      Plan   Plan of care Certification: 10/9/2019 to 12/6/19.    Outpatient Physical Therapy 2 times weekly for 8 weeks to include the following interventions: Cervical/Lumbar Traction, Electrical Stimulation IFC, Manual Therapy, Moist Heat/ Ice, Neuromuscular Re-ed, Patient Education, Therapeutic Activites, Therapeutic Exercise, Ultrasound and dry needling.     Tara Woody, PT

## 2019-10-16 ENCOUNTER — CLINICAL SUPPORT (OUTPATIENT)
Dept: REHABILITATION | Facility: HOSPITAL | Age: 64
End: 2019-10-16
Attending: ORTHOPAEDIC SURGERY
Payer: COMMERCIAL

## 2019-10-16 DIAGNOSIS — R29.898 DECREASED ROM OF NECK: ICD-10-CM

## 2019-10-16 DIAGNOSIS — M54.12 CERVICAL RADICULAR PAIN: ICD-10-CM

## 2019-10-16 PROCEDURE — 97110 THERAPEUTIC EXERCISES: CPT | Mod: PO

## 2019-10-16 PROCEDURE — 97012 MECHANICAL TRACTION THERAPY: CPT | Mod: PO

## 2019-10-16 PROCEDURE — 97140 MANUAL THERAPY 1/> REGIONS: CPT | Mod: PO,59

## 2019-10-16 NOTE — PROGRESS NOTES
Physical Therapy Daily Treatment Note     Name: Benji Ring  Clinic Number: [id number]    Therapy Diagnosis:   Encounter Diagnoses   Name Primary?    Cervical radicular pain     Decreased ROM of neck      Physician: Ramiro Laws,*    Visit Date: 10/16/2019   Physician Orders: PT Eval and Treat   Medical Diagnosis from Referral: left cervical radiculopathy  Evaluation Date: 10/9/2019  Authorization Period Expiration: 12/31/19  Plan of Care Expiration: 12/6/19  Visit # / Visits authorized: 1/ 25     Time In: 5:00PM  Time Out: 6:15PM  Total Billable Time: 60 minutes     Precautions: Standard        Subjective     Pt reports: that she had a temp decrease in sx post last tx.  She was compliant with home exercise program.  Response to previous treatment: temp decrease in sx  Functional change:  too soon to assess    Pain: 6/10  Location: left neck      Objective     Benji received therapeutic exercises to develop strength, endurance, ROM, flexibility and posture for 15 minutes including:  Supine retraction 20x  Supine ext 20x  Supine rotation 20x B    Benji received the following manual therapy techniques: Soft tissue Mobilization  were applied to the: cervical area  for 15 minutes, including:  Passive ROM for B UT stretch, B Lev Scap stretch , rotation and ext      Benji received hot pack for 10 minutes to increase circulation and promote tissue healing.    Benji received [facility] intermittent c-tx x 15 min 15# max, 8# min 30 sec hold, 10 sec rest, f/b 5 min rest.       Home Exercises Provided and Patient Education Provided     Education provided:   - effects of therapy    Written Home Exercises Provided: Patient instructed to cont prior HEP.  Exercises were reviewed and Benji was able to demonstrate them prior to the end of the session.  Benji demonstrated good  understanding of the education provided.     See EMR under Patient Instructions for exercises provided prior visit.    Assessment     Pt  alfonso tx with ther ex well no increase in pn with exs. Pt did report some L upper scap soreness post c-tx that she felt was a result of laying on the traction unit. Pt may benefit from a towel between pt and unit next visit.  Benji is progressing well towards her goals.   Pt prognosis is Good.     Pt will continue to benefit from skilled outpatient physical therapy to address the deficits listed in the problem list box on initial evaluation, provide pt/family education and to maximize pt's level of independence in the home and community environment.     Pt's spiritual, cultural and educational needs considered and pt agreeable to plan of care and goals.    Anticipated barriers to physical therapy: none    Goals:     Short Term Goals: 4 weeks   Pt will have centralization of sx not radiating past upper arm and less than 2/10.  Pt will be able to flex and rotate cervical spine to right without increase of left UE sx.  Pt will be able to sleep through night awakening with decreased pain.     Long Term Goals: 8 weeks   Pt will have centralization of sx to neck only with pain less than 2/10.  Pt will be independent with HEP for sx management    Plan     Outpatient Physical Therapy 2 times weekly for 8 weeks to include the following interventions: Cervical/Lumbar Traction, Electrical Stimulation IFC, Manual Therapy, Moist Heat/ Ice, Neuromuscular Re-ed, Patient Education, Therapeutic Activites, Therapeutic Exercise, Ultrasound and dry needling.          Mason Delgado, PTA

## 2019-10-17 ENCOUNTER — CLINICAL SUPPORT (OUTPATIENT)
Dept: REHABILITATION | Facility: HOSPITAL | Age: 64
End: 2019-10-17
Attending: ORTHOPAEDIC SURGERY
Payer: COMMERCIAL

## 2019-10-17 DIAGNOSIS — R29.898 DECREASED ROM OF NECK: ICD-10-CM

## 2019-10-17 DIAGNOSIS — M54.12 CERVICAL RADICULAR PAIN: ICD-10-CM

## 2019-10-17 PROCEDURE — 97012 MECHANICAL TRACTION THERAPY: CPT | Mod: PO

## 2019-10-17 PROCEDURE — 97140 MANUAL THERAPY 1/> REGIONS: CPT | Mod: PO

## 2019-10-17 NOTE — PROGRESS NOTES
Physical Therapy Daily Treatment Note     Name: Benji Ring  Clinic Number: [id number]    Therapy Diagnosis:   Encounter Diagnoses   Name Primary?    Cervical radicular pain     Decreased ROM of neck      Physician: Ramiro Laws,*    Visit Date: 10/17/2019  Visit Date: 10/16/2019   Physician Orders: PT Eval and Treat   Medical Diagnosis from Referral: left cervical radiculopathy  Evaluation Date: 10/9/2019  Authorization Period Expiration: 12/31/19  Plan of Care Expiration: 12/6/19  Visit # / Visits authorized: 3/ 25     Time In: 1655  Time Out: 1750  Total Billable Time: 30 minutes    Precautions: Standard    Subjective     Pt reports: her LUE radicular symptoms have been much better today. Patient states she finds that her neck and shoulder are more sore.   She was compliant with home exercise program.  Response to previous treatment: soreness in neck and shoulder but significant improvements in LUE radicular symptoms  Functional change: too soon to determine    Pain: 4/10  Location: left neck      Objective     Benji received hot pack for 10 minutes to increase circulation and promote tissue healing. (Start of treatment session)    Benji received therapeutic exercises to develop strength, ROM, flexibility and posture for 105 minutes including:  Supine retraction 20x  Supine ext 20x  Supine rotation 20x B  Seated L upper trap stretch 30 sec x 2   Seated L levator scap stretch 30 sec x 2     Benji received the following manual therapy techniques: Myofacial release and Soft tissue Mobilization were applied to the: left cervical musculature for 10 minutes, including:  STM B UT stretch, B lev scap stretch    Benji received the following supervised modalities after being cleared for contradictions: Mechanical Traction:  Benji received intermittent mechanical traction to the cervical spine at a force of 15 pounds max and 8 pounds min for a total of 15 minutes. Hold time of 30 seconds and rest  time for 10 seconds. Patient tolerated treatment well without any adverse effects.     Home Exercises Provided and Patient Education Provided     Education provided:   - effects of therapy  - centralization of symptoms     Written Home Exercises Provided: Patient instructed to cont prior HEP.  Exercises were reviewed and Benji was able to demonstrate them prior to the end of the session.  Benji demonstrated good  understanding of the education provided.     See EMR under Patient Instructions for exercises provided 10/09/19.    Assessment     Benji presented to clinic without LUE radicular symptoms this visit indicating positive response to extension biased exercises and mechanical traction. Patient able to perform L upper trap and levator scap stretching without complaints of pain so these were added to HEP. Patient required cueing to perform chin tuck/retraction without compensatory cervical flexion.  Benji is progressing well towards her goals.   Pt prognosis is Good.     Pt will continue to benefit from skilled outpatient physical therapy to address the deficits listed in the problem list box on initial evaluation, provide pt/family education and to maximize pt's level of independence in the home and community environment.     Pt's spiritual, cultural and educational needs considered and pt agreeable to plan of care and goals.    Anticipated barriers to physical therapy: none    Goals:   Short Term Goals: 4 weeks   Pt will have centralization of sx not radiating past upper arm and less than 2/10.  Pt will be able to flex and rotate cervical spine to right without increase of left UE sx.  Pt will be able to sleep through night awakening with decreased pain.     Long Term Goals: 8 weeks   Pt will have centralization of sx to neck only with pain less than 2/10.  Pt will be independent with HEP for sx management    Plan     Continue current POC with emphasis on resolving LUE radicular symptoms and decreasing  pain.     Leah Donovan, PTA

## 2019-10-21 ENCOUNTER — CLINICAL SUPPORT (OUTPATIENT)
Dept: REHABILITATION | Facility: HOSPITAL | Age: 64
End: 2019-10-21
Attending: ORTHOPAEDIC SURGERY
Payer: COMMERCIAL

## 2019-10-21 DIAGNOSIS — R29.898 DECREASED ROM OF NECK: ICD-10-CM

## 2019-10-21 DIAGNOSIS — M54.12 CERVICAL RADICULAR PAIN: ICD-10-CM

## 2019-10-21 PROCEDURE — 97110 THERAPEUTIC EXERCISES: CPT | Mod: PO | Performed by: PHYSICAL THERAPIST

## 2019-10-21 PROCEDURE — 97012 MECHANICAL TRACTION THERAPY: CPT | Mod: PO | Performed by: PHYSICAL THERAPIST

## 2019-10-21 PROCEDURE — 97140 MANUAL THERAPY 1/> REGIONS: CPT | Mod: PO | Performed by: PHYSICAL THERAPIST

## 2019-10-21 NOTE — PROGRESS NOTES
Physical Therapy Daily Treatment Note     Name: Benji Ring  Clinic Number: [id number]    Therapy Diagnosis:   Encounter Diagnoses   Name Primary?    Cervical radicular pain     Decreased ROM of neck      Physician: Ramiro Laws,*    Visit Date: 10/21/2019  Visit Date: 10/16/2019   Physician Orders: PT Eval and Treat   Medical Diagnosis from Referral: left cervical radiculopathy  Evaluation Date: 10/9/2019  Authorization Period Expiration: 12/31/19  Plan of Care Expiration: 12/6/19  Visit # / Visits authorized: 4/ 25     Time In: 4:40PM  Time Out: 5:30PM  Total Billable Time: 50 minutes    Precautions: Standard    Subjective     Pt reports: some increase in left UE sx yesterday, but has felt better all day today with sx radiating to left shoulder only.  She was compliant with home exercise program.  Response to previous treatment: centralization of sx  Functional change: sleeping better, looking down without radicular sx    Pain: 3/10  Location: left neck      Objective     Benji received hot pack for 10 minutes to increase circulation and promote tissue healing. (Start of treatment session)    Benji received therapeutic exercises to develop strength, ROM, flexibility and posture for 15 minutes including:  Supine retraction 20x  Supine ext 20x  Supine rotation 20x B  Seated L upper trap stretch 30 sec x 2   Seated L levator scap stretch 30 sec x 2     Benji received the following manual therapy techniques: Myofacial release and Soft tissue Mobilization were applied to the: left cervical musculature for 10 minutes, including:  STM B UT stretch, B lev scap stretch    Benji received the following supervised modalities after being cleared for contradictions: Mechanical Traction:  Benji received intermittent mechanical traction to the cervical spine at a force of 15 pounds max and 8 pounds min for a total of 15 minutes. Hold time of 30 seconds and rest time for 10 seconds. Patient tolerated  treatment well without any adverse effects.     Home Exercises Provided and Patient Education Provided     Education provided:   - effects of therapy  - centralization of symptoms     Written Home Exercises Provided: Patient instructed to cont prior HEP.  Exercises were reviewed and Benji was able to demonstrate them prior to the end of the session.  Benji demonstrated good  understanding of the education provided.     See EMR under Patient Instructions for exercises provided 10/09/19.    Assessment   Sx radiating to left shoulder and chest only prior to session. No pain following treatment as above. Pt demonstrating good signs of centralization of sx with current treatment plan. Instructed pt in improved mechanics and positioning of phone when texting or reading.    Benji is progressing well towards her goals.   Pt prognosis is Good.     Pt will continue to benefit from skilled outpatient physical therapy to address the deficits listed in the problem list box on initial evaluation, provide pt/family education and to maximize pt's level of independence in the home and community environment.     Pt's spiritual, cultural and educational needs considered and pt agreeable to plan of care and goals.    Anticipated barriers to physical therapy: none    Goals:   Short Term Goals: 4 weeks   Pt will have centralization of sx not radiating past upper arm and less than 2/10. PROGRESSING  Pt will be able to flex and rotate cervical spine to right without increase of left UE sx. PROGRESSING  Pt will be able to sleep through night awakening with decreased pain. PROGRESSING     Long Term Goals: 8 weeks   Pt will have centralization of sx to neck only with pain less than 2/10. PROGRESSING  Pt will be independent with HEP for sx management. PROGRESSING    Plan     Continue current POC with emphasis on resolving LUE radicular symptoms and decreasing pain.     Tara Woody, PT

## 2019-10-23 ENCOUNTER — CLINICAL SUPPORT (OUTPATIENT)
Dept: REHABILITATION | Facility: HOSPITAL | Age: 64
End: 2019-10-23
Attending: ORTHOPAEDIC SURGERY
Payer: COMMERCIAL

## 2019-10-23 DIAGNOSIS — M54.12 CERVICAL RADICULAR PAIN: ICD-10-CM

## 2019-10-23 DIAGNOSIS — R29.898 DECREASED ROM OF NECK: ICD-10-CM

## 2019-10-23 PROCEDURE — 97012 MECHANICAL TRACTION THERAPY: CPT | Mod: PO | Performed by: PHYSICAL THERAPIST

## 2019-10-23 PROCEDURE — 97140 MANUAL THERAPY 1/> REGIONS: CPT | Mod: PO,59 | Performed by: PHYSICAL THERAPIST

## 2019-10-23 PROCEDURE — 97110 THERAPEUTIC EXERCISES: CPT | Mod: PO | Performed by: PHYSICAL THERAPIST

## 2019-10-23 NOTE — PROGRESS NOTES
Physical Therapy Daily Treatment Note     Name: Benji Ring  Clinic Number: [id number]    Therapy Diagnosis:   Encounter Diagnoses   Name Primary?    Cervical radicular pain     Decreased ROM of neck      Physician: Ramiro Laws,*    Visit Date: 10/23/2019  Visit Date: 10/16/2019   Physician Orders: PT Eval and Treat   Medical Diagnosis from Referral: left cervical radiculopathy  Evaluation Date: 10/9/2019  Authorization Period Expiration: 12/31/19  Plan of Care Expiration: 12/6/19  Visit # / Visits authorized: 5/ 25     Time In: 4:40PM  Time Out: 5:30PM  Total Billable Time: 50 minutes    Precautions: Standard    Subjective     Pt reports: decreasing pain and centralization of sx to left shoulder only  She was compliant with home exercise program.  Response to previous treatment: centralization of sx  Functional change: sleeping better, looking down without radicular sx    Pain: 3/10  Location: left neck      Objective       Benji received therapeutic exercises to develop strength, ROM, flexibility and posture for 15 minutes including:  Sitting cervical retraction 5x 5sec  Scapular retraction x10  Sitting cervical extension x10  Supine retraction 20x  Supine ext 20x  Supine rotation 20x B  Seated L upper trap stretch 30 sec x 2   Seated L levator scap stretch 30 sec x 2     Benji received the following manual therapy techniques: Myofacial release and Soft tissue Mobilization were applied to the: left cervical musculature for 15 minutes, including:  STM B UT stretch, B lev scap stretch    Benji received the following supervised modalities after being cleared for contradictions: Mechanical Traction:  Benji received intermittent mechanical traction to the cervical spine at a force of 15 pounds max and 8 pounds min for a total of 15 minutes. Hold time of 30 seconds and rest time for 10 seconds. Patient tolerated treatment well without any adverse effects.     Home Exercises Provided and Patient  Education Provided     Education provided:   - effects of therapy  - centralization of symptoms     Written Home Exercises Provided: Patient instructed to cont prior HEP.  Exercises were reviewed and Benji was able to demonstrate them prior to the end of the session.  Benji demonstrated good  understanding of the education provided.     See EMR under Patient Instructions for exercises provided 10/09/19.    Assessment     Continue to cue pt regarding posture and cervical flexion positioning. Sx are centralizing to right shoulder and are less intense.    Benji is progressing well towards her goals.   Pt prognosis is Good.     Pt will continue to benefit from skilled outpatient physical therapy to address the deficits listed in the problem list box on initial evaluation, provide pt/family education and to maximize pt's level of independence in the home and community environment.     Pt's spiritual, cultural and educational needs considered and pt agreeable to plan of care and goals.    Anticipated barriers to physical therapy: none    Goals:   Short Term Goals: 4 weeks   Pt will have centralization of sx not radiating past upper arm and less than 2/10. MET 10/23/19  Pt will be able to flex and rotate cervical spine to right without increase of left UE sx. MET 10/23/19  Pt will be able to sleep through night awakening with decreased pain. MET 10/23/19     Long Term Goals: 8 weeks   Pt will have centralization of sx to neck only with pain less than 2/10. PROGRESSING  Pt will be independent with HEP for sx management. PROGRESSING    Plan     Continue current POC with emphasis on resolving LUE radicular symptoms and decreasing pain.     Tara Woody, PT

## 2019-10-28 ENCOUNTER — CLINICAL SUPPORT (OUTPATIENT)
Dept: REHABILITATION | Facility: HOSPITAL | Age: 64
End: 2019-10-28
Attending: ORTHOPAEDIC SURGERY
Payer: COMMERCIAL

## 2019-10-28 DIAGNOSIS — R29.898 DECREASED ROM OF NECK: ICD-10-CM

## 2019-10-28 DIAGNOSIS — M54.12 CERVICAL RADICULAR PAIN: ICD-10-CM

## 2019-10-28 PROCEDURE — 97110 THERAPEUTIC EXERCISES: CPT | Mod: PO | Performed by: PHYSICAL THERAPIST

## 2019-10-28 PROCEDURE — 97140 MANUAL THERAPY 1/> REGIONS: CPT | Mod: PO | Performed by: PHYSICAL THERAPIST

## 2019-10-28 PROCEDURE — 97012 MECHANICAL TRACTION THERAPY: CPT | Mod: PO | Performed by: PHYSICAL THERAPIST

## 2019-10-30 ENCOUNTER — CLINICAL SUPPORT (OUTPATIENT)
Dept: REHABILITATION | Facility: HOSPITAL | Age: 64
End: 2019-10-30
Attending: ORTHOPAEDIC SURGERY
Payer: COMMERCIAL

## 2019-10-30 DIAGNOSIS — R29.898 DECREASED ROM OF NECK: ICD-10-CM

## 2019-10-30 DIAGNOSIS — M54.12 CERVICAL RADICULAR PAIN: ICD-10-CM

## 2019-10-30 PROCEDURE — 97012 MECHANICAL TRACTION THERAPY: CPT | Mod: PO | Performed by: PHYSICAL THERAPIST

## 2019-10-30 PROCEDURE — 97140 MANUAL THERAPY 1/> REGIONS: CPT | Mod: 59,PO | Performed by: PHYSICAL THERAPIST

## 2019-10-30 PROCEDURE — 97110 THERAPEUTIC EXERCISES: CPT | Mod: PO | Performed by: PHYSICAL THERAPIST

## 2019-10-30 NOTE — PROGRESS NOTES
Physical Therapy Daily Treatment Note     Name: Benji Ring  Clinic Number: [id number]    Therapy Diagnosis:   Encounter Diagnoses   Name Primary?    Cervical radicular pain     Decreased ROM of neck      Physician: Ramiro Laws,*    Visit Date: 10/30/2019  Visit Date: 10/16/2019   Physician Orders: PT Eval and Treat   Medical Diagnosis from Referral: left cervical radiculopathy  Evaluation Date: 10/9/2019  Authorization Period Expiration: 12/31/19  Plan of Care Expiration: 12/6/19  Visit # / Visits authorized: 7/ 25     Time In: 4:40PM  Time Out: 5:30PM  Total Billable Time: 45 minutes    Precautions: Standard    Subjective     Pt reports:  Minimal discomfort today. Mild brief upper lateral shoulder twinges a few times today.  She was compliant with home exercise program.  Response to previous treatment: centralization of sx  Functional change: sleeping better, looking down without radicular sx    Pain: 1/10  Location: left neck      Objective       Benji received therapeutic exercises to develop strength, ROM, flexibility and posture for 15 minutes including:  UBE 2 min retro-not performed  Sitting cervical retraction 5x 5sec  Scapular retraction x10  Sitting cervical extension x10  Supine retraction 20x  Supine ext 20x  Supine rotation 20x B  Seated L upper trap stretch 30 sec x 2   Seated L levator scap stretch 30 sec x 2     Benji received the following manual therapy techniques: Myofacial release and Soft tissue Mobilization were applied to the: left cervical musculature for 15 minutes, including:  STM B UT stretch, B lev scap stretch    Benji received the following supervised modalities after being cleared for contradictions: Mechanical Traction:  Benji received intermittent mechanical traction to the cervical spine at a force of 17 pounds max and 8 pounds min for a total of 15 minutes. Hold time of 30 seconds and rest time for 10 seconds. Patient tolerated treatment well without any  adverse effects.     Home Exercises Provided and Patient Education Provided     Education provided:   - effects of therapy  - centralization of symptoms     Written Home Exercises Provided: Patient instructed to cont prior HEP.  Exercises were reviewed and Benji was able to demonstrate them prior to the end of the session.  Benji demonstrated good  understanding of the education provided.     See EMR under Patient Instructions for exercises provided 10/09/19.    Assessment     Good cervical mobility without reproduction of sx today. Continued centralization of sx.    Benji is progressing well towards her goals.   Pt prognosis is Good.     Pt will continue to benefit from skilled outpatient physical therapy to address the deficits listed in the problem list box on initial evaluation, provide pt/family education and to maximize pt's level of independence in the home and community environment.     Pt's spiritual, cultural and educational needs considered and pt agreeable to plan of care and goals.    Anticipated barriers to physical therapy: none    Goals:   Short Term Goals: 4 weeks   Pt will have centralization of sx not radiating past upper arm and less than 2/10. MET 10/23/19  Pt will be able to flex and rotate cervical spine to right without increase of left UE sx. MET 10/23/19  Pt will be able to sleep through night awakening with decreased pain. MET 10/23/19     Long Term Goals: 8 weeks   Pt will have centralization of sx to neck only with pain less than 2/10. PROGRESSING  Pt will be independent with HEP for sx management. PROGRESSING    Plan     Continue current POC with emphasis on resolving LUE radicular symptoms and decreasing pain.     Tara Woody, PT

## 2019-11-04 ENCOUNTER — CLINICAL SUPPORT (OUTPATIENT)
Dept: REHABILITATION | Facility: HOSPITAL | Age: 64
End: 2019-11-04
Attending: ORTHOPAEDIC SURGERY
Payer: COMMERCIAL

## 2019-11-04 DIAGNOSIS — M54.12 CERVICAL RADICULAR PAIN: ICD-10-CM

## 2019-11-04 DIAGNOSIS — R29.898 DECREASED ROM OF NECK: ICD-10-CM

## 2019-11-04 PROCEDURE — 97012 MECHANICAL TRACTION THERAPY: CPT | Mod: PO | Performed by: PHYSICAL THERAPIST

## 2019-11-04 PROCEDURE — 97110 THERAPEUTIC EXERCISES: CPT | Mod: PO | Performed by: PHYSICAL THERAPIST

## 2019-11-04 PROCEDURE — 97140 MANUAL THERAPY 1/> REGIONS: CPT | Mod: 59,PO | Performed by: PHYSICAL THERAPIST

## 2019-11-06 ENCOUNTER — CLINICAL SUPPORT (OUTPATIENT)
Dept: REHABILITATION | Facility: HOSPITAL | Age: 64
End: 2019-11-06
Attending: ORTHOPAEDIC SURGERY
Payer: COMMERCIAL

## 2019-11-06 DIAGNOSIS — R29.898 DECREASED ROM OF NECK: ICD-10-CM

## 2019-11-06 DIAGNOSIS — M54.12 CERVICAL RADICULAR PAIN: ICD-10-CM

## 2019-11-06 PROCEDURE — 97110 THERAPEUTIC EXERCISES: CPT | Mod: PO | Performed by: PHYSICAL THERAPIST

## 2019-11-06 PROCEDURE — 97140 MANUAL THERAPY 1/> REGIONS: CPT | Mod: PO | Performed by: PHYSICAL THERAPIST

## 2019-11-06 NOTE — PROGRESS NOTES
Physical Therapy Daily Treatment Note     Name: Benji Ring  Clinic Number: [id number]    Therapy Diagnosis:   Encounter Diagnoses   Name Primary?    Cervical radicular pain     Decreased ROM of neck      Physician: Ramiro Laws,*    Visit Date: 11/6/2019  Visit Date: 10/16/2019   Physician Orders: PT Eval and Treat   Medical Diagnosis from Referral: left cervical radiculopathy  Evaluation Date: 10/9/2019  Authorization Period Expiration: 12/31/19  Plan of Care Expiration: 12/6/19  Visit # / Visits authorized: 9/ 25     Time In: 4:30PM  Time Out: 5:00PM  Total Billable Time: 30 minutes    Precautions: Standard    Subjective     Pt reports:  She has had no cervical or left UE sx for 5 days.  She was compliant with home exercise program.  Response to previous treatment: centralization of sx  Functional change: sleeping better, looking down without radicular sx. Performing all ADLs without sx    Pain: 0/10  Location: left neck      Objective       Benji received therapeutic exercises to develop strength, ROM, flexibility and posture for 15 minutes including:  UBE 2 min retro-not performed  Sitting cervical retraction 5x 5sec  Scapular retraction x10  Sitting cervical extension x10  Supine retraction 20x  Supine ext 20x  Supine rotation 20x B  Seated L upper trap stretch 30 sec x 2   Seated L levator scap stretch 30 sec x 2     Benji received the following manual therapy techniques: Myofacial release and Soft tissue Mobilization were applied to the: left cervical musculature for 15 minutes, including:  STM B UT stretch, B lev scap stretch    Benji received the following supervised modalities after being cleared for contradictions: Mechanical Traction:  Benji received intermittent mechanical traction to the cervical spine at a force of 17 pounds max and 8 pounds min for a total of 00 minutes. Hold time of 30 seconds and rest time for 10 seconds. Patient tolerated treatment well without any adverse  effects.     Home Exercises Provided and Patient Education Provided     Education provided:   - effects of therapy  - centralization of symptoms     Written Home Exercises Provided: Patient instructed to cont prior HEP.  Exercises were reviewed and Benji was able to demonstrate them prior to the end of the session.  Benji demonstrated good  understanding of the education provided.     See EMR under Patient Instructions for exercises provided 10/09/19.    Assessment     Full ROM cervical spine without provocation of sx. Omitted cervical traction from treatment today to monitor sx without traction. If she continues without sx, plan to discharge. She has 5/5 UE strength. Negative Spurlings.    Benji is progressing well towards her goals.   Pt prognosis is Good.     Pt will continue to benefit from skilled outpatient physical therapy to address the deficits listed in the problem list box on initial evaluation, provide pt/family education and to maximize pt's level of independence in the home and community environment.     Pt's spiritual, cultural and educational needs considered and pt agreeable to plan of care and goals.    Anticipated barriers to physical therapy: none    Goals:   Short Term Goals: 4 weeks   Pt will have centralization of sx not radiating past upper arm and less than 2/10. MET 10/23/19  Pt will be able to flex and rotate cervical spine to right without increase of left UE sx. MET 10/23/19  Pt will be able to sleep through night awakening with decreased pain. MET 10/23/19     Long Term Goals: 8 weeks   Pt will have centralization of sx to neck only with pain less than 2/10. MET 11/6/19  Pt will be independent with HEP for sx management. PROGRESSING    Plan     Discharge if pt remains asymptomatic.    Tara Woody, PT

## 2019-11-26 ENCOUNTER — DOCUMENTATION ONLY (OUTPATIENT)
Dept: REHABILITATION | Facility: HOSPITAL | Age: 64
End: 2019-11-26

## 2019-11-26 DIAGNOSIS — R29.898 DECREASED ROM OF NECK: ICD-10-CM

## 2019-11-26 DIAGNOSIS — M54.12 CERVICAL RADICULAR PAIN: ICD-10-CM

## 2019-11-26 NOTE — PROGRESS NOTES
Outpatient Therapy Discharge Summary     Name: Benji Ring  [facility] Number: [id number]    Therapy Diagnosis: No diagnosis found.  Physician: Ramiro Laws,*     Physician Orders: PT Eval and Treat   Medical Diagnosis from Referral: left cervical radiculopathy      Date of Last visit: 11/6/19  Total Visits Received: 9      Assessment    Goals:   Short Term Goals: 4 weeks   Pt will have centralization of sx not radiating past upper arm and less than 2/10. MET 10/23/19  Pt will be able to flex and rotate cervical spine to right without increase of left UE sx. MET 10/23/19  Pt will be able to sleep through night awakening with decreased pain. MET 10/23/19     Long Term Goals: 8 weeks   Pt will have centralization of sx to neck only with pain less than 2/10. MET 11/6/19  Pt will be independent with HEP for sx management. MET 11/6/19      Discharge reason: Patient has met all of his/her goals    Plan   This patient is discharged from Physical Therapy

## 2019-12-21 NOTE — PROGRESS NOTES
INITIAL CONSULTATION     DATE: 12/29/2019  Patient Name: Benji Ring  Referred by: Self, Aaareferral  Reason for referral:  Breast cancer history      Subjective:       Patient ID: Benji Ring is a 64 y.o. female.    Chief Complaint: new patient eval breast cancer    HPI    Oncology history for review -    12/20/1999 - left breast biopsy - DCIS  12/29/1999 - wide excision L breast - path shows no residual malignancy     1999-lumpectomy radiation tamoxifen  06/08/2001- the pathology report notes myometrium, left and right fallopian tubes, left and right ovary no malignancy   She reports endometrial bleeding second to tamoxifen.   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   8/6/2003 - R SLND - 1 LN with no malig.    R breast mass excisional - mod diff infiltrating ductal carcioma 1.0cm in greatest dimension. closest margin.   2003 - neoadjuvant chemotherapy, bilateral mastectomy, aromatase inhibitor x7 years - Dr. Majano   She reports chemo every 3 weeks including adriamycin, 6 cycles including neulasta. She reports working during chemo, (worked with pre-K and special ed), no N/V  3/2004 - bilateral mastectomies. She reports did not remove many LN, doesn't recall if ALND, no swelling in arm R now.    Dr. Trejo plastics did reconstruction.   4/2004 started arimidex.   1/21/2008 - DEXA - osetopenia  2011 - completed aromatase inhibitor  07/15/2014 - medical oncology follow-up, continues no evidence of disease, report of prediabetic and now on metformin, recent diagnosis neuropathy thought not likely due to chemo and offered medications but declined.  Does have hot flashes previously offered Effexor or other antidepressant and declined.  Noted at this time change to yearly follow-up with CBC only since ten years JULIA.   06/05/2018 - medical oncology follow-up Dr. Mace -   7/24/2018 - DEXA osteopenia  9/18/2019 -   "Cbc, cmp unremarkable ex cept elevated BG.   10/4/2019 - MRI c spine - degen disease    TODAY - 19:  Confirms hx above and additional details.   She reports severe hot flashes in past as noted above and this is finally better sicne start paxil now and that really helped the hot flashes.     She reports diabetes now and on two pills, reports control is okay, A1c is recent around ~7.1.    Recent Stress with caring for her father who is in hospital and nursing home.     Exercise on bike in house off and on, less so with stress with dad illness recent.     She reports recent neck pain after "pulling something", PT resolved this and no chronic pain. Noted recent MRI neck.     ROS: yeast infections more so recent, otherwise negative ROS overall, no major complaints. She relates to DM.     Mother  of ovarian ca at 62yo.   Genetic testing - BRCA2 positive. Three sons - two were tested and youngest has the gene, one son not tested. One son 32yo with BRCA+. This was found positive a few years ago, she doesn't recall timing. Genetic counseling was not done to her knowledge.     She has two sisters - one pos for BRCA2 and had bilateral mastectomies.     Social: no smoking and drinking.     2020 planned [facility], works 5 days per week, stamina is good.       Past Medical History:   Diagnosis Date    Breast cancer     Diabetes mellitus     Early    Hypertension      Past Surgical History:   Procedure Laterality Date    BILATERAL MASTECTOMY      BREAST RECONSTRUCTION      x2    HYSTERECTOMY      MANDIBLE SURGERY      MASTECTOMY, RADICAL       Social History     Socioeconomic History    Marital status:      Spouse name: Not on file    Number of children: Not on file    Years of education: Not on file    Highest education level: Not on file   Occupational History    Not on file   Social Needs    Financial resource strain: Not on file    Food insecurity:     Worry: Not on file     Inability: Not " on file    Transportation needs:     Medical: Not on file     Non-medical: Not on file   Tobacco Use    Smoking status: Never Smoker    Smokeless tobacco: Never Used   Substance and Sexual Activity    Alcohol use: No    Drug use: No    Sexual activity: Not on file   Lifestyle    Physical activity:     Days per week: Not on file     Minutes per session: Not on file    Stress: Not on file   Relationships    Social connections:     Talks on phone: Not on file     Gets together: Not on file     Attends Congregation service: Not on file     Active member of club or organization: Not on file     Attends meetings of clubs or organizations: Not on file     Relationship status: Not on file   Other Topics Concern    Not on file   Social History Narrative    Not on file     Family History   Problem Relation Age of Onset    Cancer Mother     Parkinsonism Father     Cataracts Father     Diabetes Paternal Uncle        Current Outpatient Medications   Medication Sig Dispense Refill    atenolol (TENORMIN) 50 MG tablet       calcium carbonate (OS-DARI) 500 mg calcium (1,250 mg) tablet Take 2 tablets by mouth once daily.      cholecalciferol, vitamin D3, (VITAMIN D3) 2,000 unit Cap Take 1 capsule by mouth once daily.      cranberry 400 mg Cap Take 1,000 mg by mouth once daily.      INVOKANA 100 mg Tab       JANUVIA 100 mg Tab   4    lisinopril 10 MG tablet TAKE 1 TABLET BY MOUTH TWICE DAILY FOR HIGH BLOOD PRESSURE 180 tablet 3    methylPREDNISolone (MEDROL DOSEPACK) 4 mg tablet use as directed 21 tablet 0    MULTIVITAMIN W-MINERALS/LUTEIN (CENTRUM SILVER ORAL) Take 1 tablet by mouth once daily.      omega-3 fatty acids-vitamin E (FISH OIL) 1,000 mg Cap Take 2 tablets by mouth before breakfast.      paroxetine (PAXIL) 30 MG tablet       ketorolac (TORADOL) 10 mg tablet Take 10 mg by mouth every 6 (six) hours.      lidocaine (LIDODERM) 5 % Place 1 patch onto the skin once daily. Remove & Discard patch within 12  "hours or as directed by MD (Patient not taking: Reported on 9/25/2019) 15 patch 0    phenazopyridine (PYRIDIUM) 200 MG tablet Take 200 mg by mouth 3 (three) times daily as needed for Pain.      promethazine-dextromethorphan (PROMETHAZINE-DM) 6.25-15 mg/5 mL Syrp Take 5 mLs by mouth every 6 (six) hours as needed (night time cough). (Patient not taking: Reported on 9/25/2019) 180 mL 0    traMADol (ULTRAM) 50 mg tablet Take 1 tablet (50 mg total) by mouth every 6 (six) hours as needed for Pain. (Patient not taking: Reported on 12/27/2019) 12 tablet 0     No current facility-administered medications for this visit.      ALLERGIES REVIEWED    Review of Systems    Constitutional: Negative for activity change, appetite change, negative fatigue, fever and unexpected weight change.   HENT: Negative for mouth sores and sore throat.    Respiratory: Negative for cough and shortness of breath.    Cardiovascular: Negative for chest pain, palpitations and leg swelling.   Gastrointestinal: Negative for abdominal pain, constipation and diarrhea.   Genitourinary: Negative for dysuria and frequency.   Musculoskeletal: Negative for back pain and joint swelling.   Neurological: Negative for weakness, light-headedness and numbness.   Hematological: Does not bruise/bleed easily.   Skin: no rash, no lesions, no itching    Objective:      BP (!) 150/86   Pulse 91   Temp 97.7 °F (36.5 °C) (Oral)   Resp 18   Ht 5' 3" (1.6 m)   Wt 57.7 kg (127 lb 3.3 oz)   SpO2 100%   BMI 22.53 kg/m²    Wt Readings from Last 25 Encounters:   12/27/19 57.7 kg (127 lb 3.3 oz)   09/25/19 58.1 kg (128 lb)   09/18/19 58.4 kg (128 lb 12 oz)   03/29/19 58.2 kg (128 lb 4.9 oz)   06/05/18 58.5 kg (129 lb)   07/18/17 57.2 kg (126 lb)   02/11/16 58.1 kg (128 lb)   11/20/15 58.2 kg (128 lb 6.4 oz)   10/08/15 59 kg (130 lb)   06/29/15 57.6 kg (127 lb)       Physical Exam    Constitutional: She is oriented to person, place, and time. No distress. well appearing. "   HENT: Mouth/Throat: Oropharynx is clear and moist. No oropharyngeal exudate.   Eyes: Conjunctivae and EOM are normal.   Neck: Normal range of motion. Neck supple.   Cardiovascular: Normal rate, regular rhythm, normal heart sounds and intact distal pulses.    Pulmonary/Chest: Effort normal and breath sounds normal. She has no wheezes. She has no rales.   Breast: bilateral mastectomy with reconstruction, no masses palpated or skin change.   Abdominal: Soft. Bowel sounds are normal. She exhibits no distension. There is no tenderness.   Musculoskeletal: She exhibits no edema or tenderness.   Lymphadenopathy:   She has no cervical adenopathy. no axillary or supraclav.  Neurological: She is alert and oriented to person, place, and time. She has normal strength. No cranial nerve deficit or sensory deficit.   Skin: Skin is warm and dry. No rash noted. No erythema.   Psychiatric: She has a normal mood and affect. Her speech is normal.   Nursing note and vitals reviewed.    No results found for this or any previous visit (from the past 72 hour(s)).    Assessment:       1. History of breast cancer    2. Postmenopausal    3. Diabetes mellitus type 2 in nonobese    4. BRCA2 gene mutation positive    5. Neuropathy    6. Osteopenia, unspecified location      Cancer stagin stage 0 DCIS   stage 2 (T2N0M0) from patient description  ECOG SCORE         ECOG 1    Patient is a  64 y.o. female here with diagnosis history of breast cancer with recurrence.  See history above. She ahs remained JULIA prolonged which is great. Minimal residual SE from treatment overall which is also very good. Has maintained healthy weight and some exercise, encouraged increase. Also recent DEXA bone health has not worsened over time, also good news.     She mentioned today history of BRCA2+ so we discussed in detail she would benefit from genetic counseling, she will consider, and will discuss GI referral for pancreatic cancer screening as well as  eval if there are indications for other screening - she is s/p bilateral mastectomy and CYRUS-BSO.            Plan:       Diagnoses and all orders for this visit:    History of breast cancer  -     CBC w/ DIFF; Future    Postmenopausal    Diabetes mellitus type 2 in nonobese    BRCA2 gene mutation positive    Neuropathy    Osteopenia, unspecified location            PLAN:    MD with CBC in one year    Will look in to additional screening for her BRCA2+ gene and will contact patient with plan if there is additional screening indicated such as pancreatic ca screening.     Continue vit D/ca for osteopenia and dexa Q2yr with gyn    Exercise/nutrition  Important to keep follow-up with PCP and endocrinologist and gyn    Discussed plan above in great detail with patient and all questions answered to their satisfaction. Proceed with plan above.       Thank you for the referral. Please call with any questions.           Roselyn Alcantar M.D.  Hematology Oncology  [facility]    ADDEND: discussed with Dr. Bass and will refer for pancreatic ca screening.  GM

## 2019-12-26 ENCOUNTER — TELEPHONE (OUTPATIENT)
Dept: HEMATOLOGY/ONCOLOGY | Facility: CLINIC | Age: 64
End: 2019-12-26

## 2019-12-27 ENCOUNTER — OFFICE VISIT (OUTPATIENT)
Dept: HEMATOLOGY/ONCOLOGY | Facility: CLINIC | Age: 64
End: 2019-12-27
Payer: COMMERCIAL

## 2019-12-27 VITALS
OXYGEN SATURATION: 100 % | BODY MASS INDEX: 22.54 KG/M2 | SYSTOLIC BLOOD PRESSURE: 150 MMHG | RESPIRATION RATE: 18 BRPM | DIASTOLIC BLOOD PRESSURE: 86 MMHG | HEART RATE: 91 BPM | TEMPERATURE: 98 F | WEIGHT: 127.19 LBS | HEIGHT: 63 IN

## 2019-12-27 DIAGNOSIS — Z15.01 BRCA2 GENE MUTATION POSITIVE: ICD-10-CM

## 2019-12-27 DIAGNOSIS — G62.9 NEUROPATHY: ICD-10-CM

## 2019-12-27 DIAGNOSIS — Z85.3 HISTORY OF BREAST CANCER: Primary | ICD-10-CM

## 2019-12-27 DIAGNOSIS — Z78.0 POSTMENOPAUSAL: ICD-10-CM

## 2019-12-27 DIAGNOSIS — Z15.09 BRCA2 GENE MUTATION POSITIVE: ICD-10-CM

## 2019-12-27 DIAGNOSIS — Z12.9 CANCER SCREENING: ICD-10-CM

## 2019-12-27 DIAGNOSIS — E11.9 DIABETES MELLITUS TYPE 2 IN NONOBESE: ICD-10-CM

## 2019-12-27 DIAGNOSIS — M85.80 OSTEOPENIA, UNSPECIFIED LOCATION: ICD-10-CM

## 2019-12-27 LAB
CHOLEST SERPL-MSCNC: 184 MG/DL (ref 0–200)
HDLC SERPL-MCNC: 37 MG/DL
LDLC SERPL CALC-MCNC: 98 MG/DL (ref 0–160)
TRIGL SERPL-MCNC: 370 MG/DL (ref 40–160)

## 2019-12-27 PROCEDURE — 3008F PR BODY MASS INDEX (BMI) DOCUMENTED: ICD-10-PCS | Mod: CPTII,S$GLB,, | Performed by: INTERNAL MEDICINE

## 2019-12-27 PROCEDURE — 3077F SYST BP >= 140 MM HG: CPT | Mod: CPTII,S$GLB,, | Performed by: INTERNAL MEDICINE

## 2019-12-27 PROCEDURE — 3008F BODY MASS INDEX DOCD: CPT | Mod: CPTII,S$GLB,, | Performed by: INTERNAL MEDICINE

## 2019-12-27 PROCEDURE — 3077F PR MOST RECENT SYSTOLIC BLOOD PRESSURE >= 140 MM HG: ICD-10-PCS | Mod: CPTII,S$GLB,, | Performed by: INTERNAL MEDICINE

## 2019-12-27 PROCEDURE — 3079F PR MOST RECENT DIASTOLIC BLOOD PRESSURE 80-89 MM HG: ICD-10-PCS | Mod: CPTII,S$GLB,, | Performed by: INTERNAL MEDICINE

## 2019-12-27 PROCEDURE — 99205 OFFICE O/P NEW HI 60 MIN: CPT | Mod: S$GLB,,, | Performed by: INTERNAL MEDICINE

## 2019-12-27 PROCEDURE — 99999 PR PBB SHADOW E&M-EST. PATIENT-LVL V: ICD-10-PCS | Mod: PBBFAC,,, | Performed by: INTERNAL MEDICINE

## 2019-12-27 PROCEDURE — 99999 PR PBB SHADOW E&M-EST. PATIENT-LVL V: CPT | Mod: PBBFAC,,, | Performed by: INTERNAL MEDICINE

## 2019-12-27 PROCEDURE — 3079F DIAST BP 80-89 MM HG: CPT | Mod: CPTII,S$GLB,, | Performed by: INTERNAL MEDICINE

## 2019-12-27 PROCEDURE — 99205 PR OFFICE/OUTPT VISIT, NEW, LEVL V, 60-74 MIN: ICD-10-PCS | Mod: S$GLB,,, | Performed by: INTERNAL MEDICINE

## 2020-01-28 ENCOUNTER — TELEPHONE (OUTPATIENT)
Dept: GASTROENTEROLOGY | Facility: CLINIC | Age: 65
End: 2020-01-28

## 2020-01-28 NOTE — TELEPHONE ENCOUNTER
Spoke with pt.  She doesn't want an appointment at the present time.  Requests a call back in 2 months.

## 2020-05-18 LAB — HBA1C MFR BLD: 6.4 % (ref 4–6)

## 2020-06-18 ENCOUNTER — OFFICE VISIT (OUTPATIENT)
Dept: FAMILY MEDICINE | Facility: CLINIC | Age: 65
End: 2020-06-18
Payer: COMMERCIAL

## 2020-06-18 VITALS
TEMPERATURE: 98 F | HEART RATE: 66 BPM | HEIGHT: 63 IN | SYSTOLIC BLOOD PRESSURE: 110 MMHG | RESPIRATION RATE: 18 BRPM | OXYGEN SATURATION: 99 % | DIASTOLIC BLOOD PRESSURE: 84 MMHG | WEIGHT: 126.44 LBS | BODY MASS INDEX: 22.4 KG/M2

## 2020-06-18 DIAGNOSIS — I10 ESSENTIAL HYPERTENSION: ICD-10-CM

## 2020-06-18 DIAGNOSIS — Z78.0 POSTMENOPAUSAL: ICD-10-CM

## 2020-06-18 DIAGNOSIS — Z15.09 BRCA2 GENE MUTATION POSITIVE: ICD-10-CM

## 2020-06-18 DIAGNOSIS — Z85.3 HISTORY OF BREAST CANCER: ICD-10-CM

## 2020-06-18 DIAGNOSIS — Z00.00 PREVENTATIVE HEALTH CARE: Primary | ICD-10-CM

## 2020-06-18 DIAGNOSIS — Z15.01 BRCA2 GENE MUTATION POSITIVE: ICD-10-CM

## 2020-06-18 DIAGNOSIS — M85.80 OSTEOPENIA, UNSPECIFIED LOCATION: ICD-10-CM

## 2020-06-18 DIAGNOSIS — G62.9 NEUROPATHY: ICD-10-CM

## 2020-06-18 DIAGNOSIS — M76.31 IT BAND SYNDROME, RIGHT: ICD-10-CM

## 2020-06-18 DIAGNOSIS — E11.9 DIABETES MELLITUS TYPE 2 IN NONOBESE: ICD-10-CM

## 2020-06-18 PROCEDURE — 99999 PR PBB SHADOW E&M-EST. PATIENT-LVL V: ICD-10-PCS | Mod: PBBFAC,,, | Performed by: INTERNAL MEDICINE

## 2020-06-18 PROCEDURE — 90471 PNEUMOCOCCAL POLYSACCHARIDE VACCINE 23-VALENT =>2YO SQ IM: ICD-10-PCS | Mod: S$GLB,,, | Performed by: INTERNAL MEDICINE

## 2020-06-18 PROCEDURE — 3079F DIAST BP 80-89 MM HG: CPT | Mod: CPTII,S$GLB,, | Performed by: INTERNAL MEDICINE

## 2020-06-18 PROCEDURE — 99396 PR PREVENTIVE VISIT,EST,40-64: ICD-10-PCS | Mod: 25,S$GLB,, | Performed by: INTERNAL MEDICINE

## 2020-06-18 PROCEDURE — 90732 PPSV23 VACC 2 YRS+ SUBQ/IM: CPT | Mod: S$GLB,,, | Performed by: INTERNAL MEDICINE

## 2020-06-18 PROCEDURE — 3008F BODY MASS INDEX DOCD: CPT | Mod: CPTII,S$GLB,, | Performed by: INTERNAL MEDICINE

## 2020-06-18 PROCEDURE — 99396 PREV VISIT EST AGE 40-64: CPT | Mod: 25,S$GLB,, | Performed by: INTERNAL MEDICINE

## 2020-06-18 PROCEDURE — 3079F PR MOST RECENT DIASTOLIC BLOOD PRESSURE 80-89 MM HG: ICD-10-PCS | Mod: CPTII,S$GLB,, | Performed by: INTERNAL MEDICINE

## 2020-06-18 PROCEDURE — 99999 PR PBB SHADOW E&M-EST. PATIENT-LVL V: CPT | Mod: PBBFAC,,, | Performed by: INTERNAL MEDICINE

## 2020-06-18 PROCEDURE — 90732 PNEUMOCOCCAL POLYSACCHARIDE VACCINE 23-VALENT =>2YO SQ IM: ICD-10-PCS | Mod: S$GLB,,, | Performed by: INTERNAL MEDICINE

## 2020-06-18 PROCEDURE — 3074F PR MOST RECENT SYSTOLIC BLOOD PRESSURE < 130 MM HG: ICD-10-PCS | Mod: CPTII,S$GLB,, | Performed by: INTERNAL MEDICINE

## 2020-06-18 PROCEDURE — 90471 IMMUNIZATION ADMIN: CPT | Mod: S$GLB,,, | Performed by: INTERNAL MEDICINE

## 2020-06-18 PROCEDURE — 3074F SYST BP LT 130 MM HG: CPT | Mod: CPTII,S$GLB,, | Performed by: INTERNAL MEDICINE

## 2020-06-18 PROCEDURE — 3008F PR BODY MASS INDEX (BMI) DOCUMENTED: ICD-10-PCS | Mod: CPTII,S$GLB,, | Performed by: INTERNAL MEDICINE

## 2020-06-18 RX ORDER — GLIMEPIRIDE 4 MG/1
TABLET ORAL
COMMUNITY
Start: 2020-03-25

## 2020-06-18 RX ORDER — LOSARTAN POTASSIUM 50 MG/1
50 TABLET ORAL DAILY
Qty: 90 TABLET | Refills: 1 | Status: SHIPPED | OUTPATIENT
Start: 2020-06-18 | End: 2021-02-09

## 2020-06-18 NOTE — PROGRESS NOTES
Assessment and Plan:    1. Preventative health care  Discussed age appropriate preventative healthcare items including avoidance of tobacco, excessive alcohol consumption, and illicit drugs. Discussed safe sex practices. Discussed appropriate use of sunscreen, seatbelts, etc. Discussed maintenance of a healthy weight.   AMANDA from [facility] for any labs in the last year  AMANDA Dr. Salcido for Colonoscopy  - HIV 1/2 Ag/Ab (4th Gen); Future  - Hepatitis C Antibody; Future  - Pneumococcal Polysaccharide Vaccine (23 Valent) (SQ/IM)  - HIV 1/2 Ag/Ab (4th Gen)  - Hepatitis C Antibody    2. Essential hypertension  Will change lisinopril to losartan. BP controlled.   - losartan (COZAAR) 50 MG tablet; Take 1 tablet (50 mg total) by mouth once daily.  Dispense: 90 tablet; Refill: 1    3. Diabetes mellitus type 2 in nonobese  Records requested from Endocrine, will continue to follow up with Endocrine.    4. History of breast cancer  5. BRCA2 gene mutation positive  Continue follow up with Heme/Onc.     6. Postmenopausal  Continue paxil per Gyn.     7. Neuropathy  Unknown if related to DM or chemotherapy. Intact protective sensation on exam today.    8. Osteopenia, unspecified location  Continue follow up with Endocrine.    9. It band syndrome, right  - Ambulatory referral/consult to Physical/Occupational Therapy; Future      ______________________________________________________________________  Subjective:    Chief Complaint:  Establish [facility], annual    HPI:  Benji is a 64 y.o. year old woman here to establish [facility] and for annual exam.     History of breast cancer- Seeing Dr. Alcantar. Had been recurrent. Now s/p bilateral mastectomy.     HTN- Has been taking atenolol and lisinopril per her endocrinologist.     She sees Dr. Fiore for diabetes. Taking Januvia, Invokana, and glimepiride. Unknown last A1c but states this has been controlled.    She takes paxil for hot flushes per her gyn.     She has been having some right sided hip pain,  mainly at night whey lying on it, or sometimes when moving her leg to the side. Radiates down toward her knee.     Past Medical History:  Past Medical History:   Diagnosis Date    Breast cancer     Diabetes mellitus     Early    Hypertension        Past Surgical History:  Past Surgical History:   Procedure Laterality Date    BILATERAL MASTECTOMY      BREAST RECONSTRUCTION      x2    HYSTERECTOMY      MANDIBLE SURGERY      MASTECTOMY, RADICAL         Family History:  Family History   Problem Relation Age of Onset    Cancer Mother     Parkinsonism Father     Cataracts Father     Diabetes Paternal Uncle        Social History:  Social History     Socioeconomic History    Marital status:      Spouse name: Not on file    Number of children: Not on file    Years of education: Not on file    Highest education level: Not on file   Occupational History    Not on file   Social Needs    Financial resource strain: Not on file    Food insecurity     Worry: Not on file     Inability: Not on file    Transportation needs     Medical: Not on file     Non-medical: Not on file   Tobacco Use    Smoking status: Never Smoker    Smokeless tobacco: Never Used   Substance and Sexual Activity    Alcohol use: No    Drug use: No    Sexual activity: Not on file   Lifestyle    Physical activity     Days per week: Not on file     Minutes per session: Not on file    Stress: Not on file   Relationships    Social connections     Talks on phone: Not on file     Gets together: Not on file     Attends Mandaen service: Not on file     Active member of club or organization: Not on file     Attends meetings of clubs or organizations: Not on file     Relationship status: Not on file   Other Topics Concern    Not on file   Social History Narrative    Not on file       Medications:  Current Outpatient Medications on File Prior to Visit   Medication Sig Dispense Refill    atenolol (TENORMIN) 50 MG tablet       calcium  carbonate (OS-DARI) 500 mg calcium (1,250 mg) tablet Take 2 tablets by mouth once daily.      cholecalciferol, vitamin D3, (VITAMIN D3) 2,000 unit Cap Take 1 capsule by mouth once daily.      cranberry 400 mg Cap Take 1,000 mg by mouth once daily.      glimepiride (AMARYL) 4 MG tablet       INVOKANA 100 mg Tab       JANUVIA 100 mg Tab   4    Lactobacillus rhamnosus GG (CULTURELLE) 10 billion cell capsule Take 1 capsule by mouth once daily.      lisinopril 10 MG tablet TAKE 1 TABLET BY MOUTH TWICE DAILY FOR HIGH BLOOD PRESSURE 180 tablet 3    MULTIVITAMIN W-MINERALS/LUTEIN (CENTRUM SILVER ORAL) Take 1 tablet by mouth once daily.      omega-3 fatty acids-vitamin E (FISH OIL) 1,000 mg Cap Take 2 tablets by mouth before breakfast.      paroxetine (PAXIL) 30 MG tablet       [DISCONTINUED] ketorolac (TORADOL) 10 mg tablet Take 10 mg by mouth every 6 (six) hours.      [DISCONTINUED] lidocaine (LIDODERM) 5 % Place 1 patch onto the skin once daily. Remove & Discard patch within 12 hours or as directed by MD (Patient not taking: Reported on 9/25/2019) 15 patch 0    [DISCONTINUED] methylPREDNISolone (MEDROL DOSEPACK) 4 mg tablet use as directed (Patient not taking: Reported on 6/18/2020) 21 tablet 0    [DISCONTINUED] phenazopyridine (PYRIDIUM) 200 MG tablet Take 200 mg by mouth 3 (three) times daily as needed for Pain.      [DISCONTINUED] promethazine-dextromethorphan (PROMETHAZINE-DM) 6.25-15 mg/5 mL Syrp Take 5 mLs by mouth every 6 (six) hours as needed (night time cough). (Patient not taking: Reported on 9/25/2019) 180 mL 0    [DISCONTINUED] traMADol (ULTRAM) 50 mg tablet Take 1 tablet (50 mg total) by mouth every 6 (six) hours as needed for Pain. (Patient not taking: Reported on 12/27/2019) 12 tablet 0     No current facility-administered medications on file prior to visit.        Allergies:  Meperidine, Demerol (pf) [meperidine (pf)], Ibuprofen, and Naprosyn [naproxen]    Immunizations:  Immunization  "History   Administered Date(s) Administered    Influenza - Quadrivalent 10/02/2015, 09/30/2016       Review of Systems:  Review of Systems   Constitutional: Negative for chills, fever and unexpected weight change.   HENT: Negative for congestion and trouble swallowing.    Eyes: Negative for pain and visual disturbance.   Respiratory: Negative for shortness of breath and wheezing.    Cardiovascular: Negative for chest pain and leg swelling.   Gastrointestinal: Negative for abdominal pain, constipation and diarrhea.   Endocrine: Negative for cold intolerance and heat intolerance.   Genitourinary: Negative for difficulty urinating and hematuria.   Musculoskeletal: Positive for arthralgias. Negative for myalgias.   Skin: Negative for rash and wound.   Allergic/Immunologic: Negative for environmental allergies and food allergies.   Neurological: Negative for seizures and syncope.   Psychiatric/Behavioral: Negative for dysphoric mood. The patient is not nervous/anxious.        Objective:    Vitals:  Vitals:    06/18/20 0821   BP: 110/84   Pulse: 66   Resp: 18   Temp: 98.1 °F (36.7 °C)   TempSrc: Temporal   SpO2: 99%   Weight: 57.3 kg (126 lb 6.9 oz)   Height: 5' 3" (1.6 m)   PainSc:   4   PainLoc: Hip       Physical Exam  Vitals signs reviewed.   Constitutional:       General: She is not in acute distress.     Appearance: She is well-developed. She is not diaphoretic.   HENT:      Mouth/Throat:      Pharynx: No oropharyngeal exudate.   Eyes:      General: No scleral icterus.        Right eye: No discharge.         Left eye: No discharge.      Conjunctiva/sclera: Conjunctivae normal.   Neck:      Musculoskeletal: Neck supple.      Thyroid: No thyromegaly.      Trachea: No tracheal deviation.   Cardiovascular:      Rate and Rhythm: Normal rate and regular rhythm.      Pulses:           Dorsalis pedis pulses are 2+ on the right side and 2+ on the left side.      Heart sounds: Normal heart sounds. No murmur.   Pulmonary:     "  Effort: Pulmonary effort is normal. No respiratory distress.      Breath sounds: Normal breath sounds. No wheezing or rales.   Abdominal:      General: Bowel sounds are normal. There is no distension.      Palpations: Abdomen is soft.      Tenderness: There is no abdominal tenderness.      Hernia: No hernia is present.   Musculoskeletal:      Right foot: No deformity.      Left foot: No deformity.   Feet:      Right foot:      Protective Sensation: 5 sites tested. 5 sites sensed.      Skin integrity: No ulcer, skin breakdown or callus.      Left foot:      Protective Sensation: 5 sites tested. 5 sites sensed.      Skin integrity: No ulcer, skin breakdown or callus.   Lymphadenopathy:      Cervical: No cervical adenopathy.   Skin:     General: Skin is warm and dry.   Neurological:      Mental Status: She is alert and oriented to person, place, and time.   Psychiatric:         Behavior: Behavior normal.         Judgment: Judgment normal.         Body mass index is 22.4 kg/m².      Data:  AMANDA sent for previous records. and Previous labs reviewed and pertinent for glucose 218 in ER last year.        Sharita Stevens MD  Internal Medicine

## 2020-06-19 DIAGNOSIS — E11.9 TYPE 2 DIABETES MELLITUS WITHOUT COMPLICATION: ICD-10-CM

## 2020-06-29 ENCOUNTER — CLINICAL SUPPORT (OUTPATIENT)
Dept: REHABILITATION | Facility: HOSPITAL | Age: 65
End: 2020-06-29
Payer: COMMERCIAL

## 2020-06-29 DIAGNOSIS — R29.898 DECREASED STRENGTH OF LOWER EXTREMITY: ICD-10-CM

## 2020-06-29 DIAGNOSIS — M62.9 HAMSTRING TIGHTNESS OF BOTH LOWER EXTREMITIES: ICD-10-CM

## 2020-06-29 DIAGNOSIS — R26.89 DECREASED FUNCTIONAL MOBILITY: ICD-10-CM

## 2020-06-29 DIAGNOSIS — M25.651 DECREASED RANGE OF RIGHT HIP MOVEMENT: ICD-10-CM

## 2020-06-29 PROCEDURE — 97110 THERAPEUTIC EXERCISES: CPT | Mod: PN

## 2020-06-29 PROCEDURE — 97161 PT EVAL LOW COMPLEX 20 MIN: CPT | Mod: PN

## 2020-06-29 NOTE — PLAN OF CARE
[facility]  Physical Therapy Initial Evaluation    Date: 6/29/2020   Name: Benji Ring  Clinic Number: [id number]    Therapy Diagnosis:   Encounter Diagnoses   Name Primary?    Decreased range of right hip movement     Decreased functional mobility     Hamstring tightness of both lower extremities     Decreased strength of lower extremity      Physician: Sharita Stevens MD    Physician Orders: PT Eval and Treat  Medical Diagnosis from Referral: M76.31 (ICD-10-CM) - It band syndrome, right  Evaluation Date: 6/29/2020  Authorization Period Expiration: 12/31/2020  Plan of Care Expiration: 8/24/2020  Visit # / Visits authorized: 1/ 20    Time In: 10:45 AM  Time Out: 11:30 AM  Total Appointment Time (timed & untimed codes): 45 minutes    Precautions: HTN, Hx of Breast Cancer, BRCA2 gene mutation positive, Diabetes Type II, Osteopenia    Subjective   Date of onset: 6 months at least - happened out of the blue  History of current condition - Benji reports: that several months ago her hip started bothering her put of the blue and has progressively gotten worse over time. The right leg is the one with the pain and it starts at her hip and travels all the way down to her knee. She drives a [place] and has to get in/out with her left leg because lifting the leg up. Walking also bothers her hip/leg as well. She has not tried any type of medication or modality to make it feel better. She can feel a little knot around her right hip. Crossing her legs bothers her. 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 She wants to be able to return to work without any of these problems. Sleeping on the R hip is pretty painful as well.     Medical History:   Past Medical History:   Diagnosis Date    Breast cancer     Recurrent    Diabetes     Early    Hypertension        Surgical History:   Benji Ring  has a past surgical history that  includes Mastectomy, radical; Breast reconstruction; Mandible surgery; Hysterectomy; and Bilateral mastectomy.    Medications:   Benji has a current medication list which includes the following prescription(s): atenolol, calcium carbonate, cholecalciferol (vitamin d3), cranberry, glimepiride, invokana, januvia, lactobacillus rhamnosus gg, losartan, folic acid/multivit-min/lutein, omega-3 fatty acids-vitamin e, and paroxetine.    Allergies:   Review of patient's allergies indicates:   Allergen Reactions    Meperidine Nausea And Vomiting and Nausea Only    Demerol (pf) [meperidine (pf)]     Ibuprofen     Naprosyn [naproxen] Rash        Imaging: none    Prior Therapy: yes for her neck in [place]  Social History: lives with her  and dog  Occupation:  in the school system  Prior Level of Function: able to perform all ADL's  Current Level of Function: unable to lift leg, difficulty getting up from couch/chair, unable to cross her legs    Pain:  Current 3/10, worst 9/10, best 1/10   Location: right hip and lateral thigh to knee  Description: Throbbing and stabbing (sometimes)  Aggravating Factors: Evening time, standing up, crossing her legs  Easing Factors: rest and morning    Pts goals: improve mobility, decrease pain    Objective     Observation/Posture: slightly antalgic gait pattern, no obvious signs of deviation upon entering clinic. While seated, pt would continuously massage/rub the R side of the thigh. Slightly rounded shoulders in sitting. Pt is very pleasant.    Hip Range of Motion:   Left active Left Passive Right active  Right Passive   Flexion 115 120 115* 120*   Abduction 45 45 45 45   Ext. Rotation 50 50 40 42   Int. Rotation 22 24 20 22     Special Tests:  LÓPEZ: -  FADIR: -    Range of Motion:   Knee Left active Left Passive Right Active R passive   Flexion 145 145 145 145   Extension 0 0 0 0       Lower Extremity Strength  Right LE  Left LE    Knee extension: 5/5 Knee  extension: 5/5   Knee flexion: 5/5 Knee flexion: 5/5   Hip flexion: 4/5 * Hip flexion: 4+/5   Hip extension:  4/5 Hip extension: 4/5   Hip abduction: 4/5* Hip abduction: 4/5   Hip adduction: 4/5 Hip adduction 4/5   Ankle dorsiflexion: 5/5 Ankle dorsiflexion: 5/5   Ankle plantarflexion: 5/5 Ankle plantarflexion: 5/5   Pain = *    Function:  - Sit <--> Stand: independent but has pain   - Bed Mobility: independent    Joint Mobility: WFL into all planes - Patellar, SI: pt was slightly rotated anteriorly on the R upon examination - push pull technique was performed to correct R anterior rotation but patient did not report any change in symptoms after. Will continue to check each session.    Palpation: TTP along R IT band, proximal aspect is more tender compared to the distal component    Sensation: intact B to light touch    Flexibility:    Hamstring Test: R = 59 degrees ; L = 55 degrees   Annika's test: R = + ; L = +    Edema: none present    Limitation/Restriction for FOTO Upper Leg Survey    Therapist reviewed FOTO scores for Benji Ring on 6/29/2020.   FOTO documents entered into [employer] - see Media section.    Limitation Score: 49%       TREATMENT   Treatment Time In: 11:20 AM  Treatment Time Out: 11:30 AM  Total Treatment time (time-based codes) separate from Evaluation: 10 minutes    Benji received therapeutic exercises to develop strength, endurance, ROM and flexibility for 10 minutes including:    Supine Hamstring Stretch 10x10s B  Standing IT Band Stretch 2x20s B  Supine Hip Abduction x10 reps YTB    Possible for Next Session: STM to the R IT band, cupping to IT band, Hip bridges, Hip IR with band, Hip ER with band, step ups (forwards and lateral, SLR, SL Hip abduction with straight leg      Home Exercises and Patient Education Provided    Education provided:   - HEP Administration and Review  - Post Exercise Soreness  - Anatomy/Physiology of the knee and surrounding musculature  - maintaining a pain free ROM  as much as possible with activity    Written Home Exercises Provided: yes.  Exercises were reviewed and Benji was able to demonstrate them prior to the end of the session.  Benji demonstrated good  understanding of the education provided.     See EMR under Patient Instructions for exercises provided 6/29/2020.    Assessment   Benji is a 64 y.o. female referred to outpatient Physical Therapy with a medical diagnosis of IT band syndrome, right. Pt presents with decreased R hip ROM, especially into hip ER, decreased LE strength, especially into hip abduction, flexion, and extension, poor hamstring flexibility, positive Annika's test, and decreased functional mobility overall.    Pt prognosis is Good.   Pt will benefit from skilled outpatient Physical Therapy to address the deficits stated above and in the chart below, provide pt/family education, and to maximize pt's level of independence.     Plan of care discussed with patient: Yes  Pt's spiritual, cultural and educational needs considered and patient is agreeable to the plan of care and goals as stated below:     Anticipated Barriers for therapy: None    Medical Necessity is demonstrated by the following  History  Co-morbidities and personal factors that may impact the plan of care Co-morbidities:   HTN, Hx of Breast Cancer, BRCA2 gene mutation positive, Diabetes Type II, Osteopenia    Personal Factors:   no deficits     high   Examination  Body Structures and Functions, activity limitations and participation restrictions that may impact the plan of care Body Regions:   lower extremities    Body Systems:    gross symmetry  ROM  strength  gross coordinated movement    Participation Restrictions:   Unable to bend over  Unable to lift leg into car without pain  Unable to sleep on R hip  Unable to cross legs    Activity limitations:   Learning and applying knowledge  no deficits    General Tasks and Commands  no deficits    Communication  no  deficits    Mobility  lifting and carrying objects  walking  driving (bike, car, motorcycle)    Self care  no deficits    Domestic Life  assisting others    Interactions/Relationships  no deficits    Life Areas  no deficits    Community and Social Life  community life  recreation and leisure         moderate   Clinical Presentation stable and uncomplicated low   Decision Making/ Complexity Score: low     Goals:  Short Term Goals: 4 weeks  - Pt will demonstrate improved R hip ROM, especially into hip ER, by at least 5 degrees for improved mobility overall.  - Pt will demonstrate improved LE strength, especially into hip abduction and extension, by at least 1/2 grade via MMT for improved tolerance to daily activities.  - Pt will demonstrate increased hamstring flexibility bilaterally, via hamstring test, by at least 5 degrees for improved tissue extensibility in the lower extremities.  - Pt will demonstrate independence with HEP for continued improvements outside of the clinical setting.    Long Term Goals: 8 weeks   - Pt will demonstrate a negative Annika's test for improved mobility and extensibility of the IT band.  - Pt will demonstrate improved LE strength, especially into hip abduction and extension, by at least 1 full grade via MMT for improved tolerance to daily activities.  - Pt will demonstrate increased hamstring flexibility bilaterally, via hamstring test, by at least 10 degrees for improved tissue extensibility in the lower extremities.  - Pt will be able to perform all functional movements required to return to work (bending over, crossing her legs, picking something up from the floor) with minimal to no provocation of pain for return to PLOF.    Plan   Plan of care Certification: 6/29/2020 to 8/24/2020.    Outpatient Physical Therapy 2 times weekly for 8 weeks to include the following interventions: Electrical Stimulation IFC/TENS/PREMOD, Gait Training, Manual Therapy, Moist Heat/ Ice, Neuromuscular  Re-ed, Patient Education, Self Care, Therapeutic Activites, Therapeutic Exercise, Ultrasound and Dry Needling (by a certified therapist).     Possible for Next Session: STM to the R IT band, cupping to IT band, Hip bridges, Hip IR with band, Hip ER with band, step ups (forwards and lateral, SLR, SL Hip abduction with straight leg    This patient CAN be treated by a PTA.    Daija Larsen, PT, DPT

## 2020-07-02 NOTE — PROGRESS NOTES
Physical Therapy Daily Treatment Note     Name: Benji Ring  Clinic Number: [id number]    Therapy Diagnosis:   Encounter Diagnoses   Name Primary?    Decreased range of right hip movement     Decreased functional mobility     Hamstring tightness of both lower extremities     Decreased strength of lower extremity      Physician: Sharita Stevens MD    Visit Date: 7/6/2020     Physician Orders: PT Eval and Treat  Medical Diagnosis from Referral: M76.31 (ICD-10-CM) - It band syndrome, right  Evaluation Date: 6/29/2020  Authorization Period Expiration: 12/31/2020  Plan of Care Expiration: 8/24/2020  Visit # / Visits authorized: 2/ 20    Time In: 10:00 AM  Time Out: 10:40 AM  Total Billable Time: 40 minutes    Precautions: HTN, Hx of Breast Cancer, BRCA2 gene mutation positive, Diabetes Type II, Osteopenia    Subjective     Pt reports: that she is feeling the same as last time, no change at all. She was able to do some exercises some of the time. Continues to have mild pain in the morning that increases throughout the day.    She was somewhat compliant with home exercise program.  Response to previous treatment: no change  Functional change: too soon to tell    Pain: 5/10  Location: right hip      Objective     Benji received therapeutic exercises to develop strength, endurance, ROM and flexibility for 25 minutes including:     Standing IT Band Stretch 2x20s B  Supine Hamstring Stretch 10x10s B  Supine Hip Abduction 2x10 reps Y Sport Loop (BKFO)  Supine Hip Adduction 2x10 reps (3 sec holds) yellow ball  Supine Hip Bridges 2x10 reps B  SL Hip Abduction x10 reps B  Seated Hip IR with YTB 2x10 reps B  Seated Hip ER with YB 2x10 reps B  Standing Gastroc/Soleus Stretch 2x20s B (slant board level 3)     Possible for Next Session: step ups (forwards and lateral), SLR, supine hip flexion, progress bridges    Benji received the following manual therapy techniques: Myofacial release and Soft tissue Mobilization were  applied for 15 minutes, including:    STM to the R IT Band  Cupping to the R IT Band    Home Exercises Provided and Patient Education Provided     Education provided:   - HEP Review  - Post Exercise Soreness  - Anatomy/Physiology of the knee and surrounding musculature  - maintaining a pain free ROM as much as possible with activity  - Cupping pros/cons    Written Home Exercises Provided: Patient instructed to cont prior HEP.  Exercises were reviewed and Benji was able to demonstrate them prior to the end of the session.  Benji demonstrated good  understanding of the education provided.     See EMR under Patient Instructions for exercises provided on 6/29/2020 (initial evaluation).    Assessment     Pt tolerated therapy fairly well this morning. Initiated the treatment session with cupping combined with STM to the R IT band, slight TTP noted along R hip but patient tolerated manual techniques well overall. Emphasis placed on proper glute activation prior to each hip bridge. Progressed supine hip abduction to include BKFO for increased core stability and activation. Also introduced seated Hip IR and ER with mild resistance, pt did not report any increase in R hip pain but muscle fatigue was evident after activity. Encouraged the patient to maintain a pain free ROM thorughout the treatment session. Educated the patient on the importance of performing HEP for continued improvements outside of the clinical setting. Pt verbalized understanding.    Benji is progressing well towards her goals.   Pt prognosis is Good.     Pt will continue to benefit from skilled outpatient physical therapy to address the deficits listed in the problem list box on initial evaluation, provide pt/family education and to maximize pt's level of independence in the home and community environment.     Pt's spiritual, cultural and educational needs considered and pt agreeable to plan of care and goals.    Anticipated barriers to physical  therapy: None    Goals:   Short Term Goals: 4 weeks  - Pt will demonstrate improved R hip ROM, especially into hip ER, by at least 5 degrees for improved mobility overall. (progressing, not met)  - Pt will demonstrate improved LE strength, especially into hip abduction and extension, by at least 1/2 grade via MMT for improved tolerance to daily activities. (progressing, not met)  - Pt will demonstrate increased hamstring flexibility bilaterally, via hamstring test, by at least 5 degrees for improved tissue extensibility in the lower extremities. (progressing, not met)  - Pt will demonstrate independence with HEP for continued improvements outside of the clinical setting. (progressing, not met)     Long Term Goals: 8 weeks   - Pt will demonstrate a negative Annika's test for improved mobility and extensibility of the IT band. (progressing, not met)  - Pt will demonstrate improved LE strength, especially into hip abduction and extension, by at least 1 full grade via MMT for improved tolerance to daily activities. (progressing, not met)  - Pt will demonstrate increased hamstring flexibility bilaterally, via hamstring test, by at least 10 degrees for improved tissue extensibility in the lower extremities. (progressing, not met)  - Pt will be able to perform all functional movements required to return to work (bending over, crossing her legs, picking something up from the floor) with minimal to no provocation of pain for return to PLOF. (progressing, not met)    Plan     Continue with established POC for improved functional mobility, increased LE strength, and increased flexibility.    Possible for Next Session: step ups (forwards and lateral), SLR, supine hip flexion, progress loyda Larsen, PT, DPT

## 2020-07-06 ENCOUNTER — CLINICAL SUPPORT (OUTPATIENT)
Dept: REHABILITATION | Facility: HOSPITAL | Age: 65
End: 2020-07-06
Payer: COMMERCIAL

## 2020-07-06 DIAGNOSIS — M25.651 DECREASED RANGE OF RIGHT HIP MOVEMENT: ICD-10-CM

## 2020-07-06 DIAGNOSIS — R26.89 DECREASED FUNCTIONAL MOBILITY: ICD-10-CM

## 2020-07-06 DIAGNOSIS — M62.9 HAMSTRING TIGHTNESS OF BOTH LOWER EXTREMITIES: ICD-10-CM

## 2020-07-06 DIAGNOSIS — R29.898 DECREASED STRENGTH OF LOWER EXTREMITY: ICD-10-CM

## 2020-07-06 PROCEDURE — 97140 MANUAL THERAPY 1/> REGIONS: CPT | Mod: PN

## 2020-07-06 PROCEDURE — 97110 THERAPEUTIC EXERCISES: CPT | Mod: PN

## 2020-07-08 NOTE — PROGRESS NOTES
Physical Therapy Daily Treatment Note     Name: Benji Ring  Clinic Number: [id number]    Therapy Diagnosis:   Encounter Diagnoses   Name Primary?    Decreased range of right hip movement     Decreased functional mobility     Hamstring tightness of both lower extremities     Decreased strength of lower extremity      Physician: Sharita Stevens MD    Visit Date: 7/9/2020     Physician Orders: PT Eval and Treat  Medical Diagnosis from Referral: M76.31 (ICD-10-CM) - It band syndrome, right  Evaluation Date: 6/29/2020  Authorization Period Expiration: 12/31/2020  Plan of Care Expiration: 8/24/2020  Visit # / Visits authorized: 3/ 20    Time In: 10:15 AM  Time Out: 10:55 AM  Total Billable Time: 40 minutes    Precautions: HTN, Hx of Breast Cancer, BRCA2 gene mutation positive, Diabetes Type II, Osteopenia    Subjective     Pt reports: that she is feeling about the same after last session. She got on some ice after last time as well and it felt better.     She was somewhat compliant with home exercise program.  Response to previous treatment: no change  Functional change: too soon to tell    Pain: 4/10  Location: right hip      Objective     Benji received therapeutic exercises to develop strength, endurance, ROM and flexibility for 25 minutes including:     Standing IT Band Stretch 2x20s B  Supine Hamstring Stretch 10x10s B  Supine Piriformis Stretch 2x20s B  Supine Hip Abduction 2x10 reps Y Sport Loop (BKFO)  Supine Hip Adduction 2x10 reps (3 sec holds) yellow ball  Supine Hip Bridges 2x10 reps B  Supine Hip Flexion with YTB x10 reps BLE - only able to perform on LLE, pain on the R so exercise was terminated  SL Hip Abduction x15 reps B  Seated Hip IR with YTB 2x10 reps B  Seated Hip ER with YB 2x10 reps B  Standing Gastroc/Soleus Stretch 2x20s B (slant board level 3)     Possible for Next Session: step ups (forwards and lateral), SLR, supine hip flexion, progress bridges    Benji received the following  manual therapy techniques: Myofacial release and Soft tissue Mobilization were applied for 15 minutes, including:    STM to the R IT Band  Cupping to the R IT Band    Home Exercises Provided and Patient Education Provided     Education provided:   - HEP Review  - Post Exercise Soreness  - Anatomy/Physiology of the knee and surrounding musculature  - maintaining a pain free ROM as much as possible with activity  - Cupping pros/cons    Written Home Exercises Provided: Patient instructed to cont prior HEP.  Exercises were reviewed and Benji was able to demonstrate them prior to the end of the session.  Benji demonstrated good  understanding of the education provided.     See EMR under Patient Instructions for exercises provided on 6/29/2020 (initial evaluation).    Assessment     Pt tolerated therapy fairly well this morning. Pt complained of slight increase in symptoms when performing RLE supine hip flexion both with and without resistance so the exercise was terminated. Encouraged patient to maintain proper glute activation prior to each exercise. PT performed soft tissue massage and cupping to the R IT band area for improved tissue extensibility and decreased pain - pt reported reduced pain at the conclusion of the techniques. Introduced supine piriformis stretch this date as well.    Benji is progressing well towards her goals.   Pt prognosis is Good.     Pt will continue to benefit from skilled outpatient physical therapy to address the deficits listed in the problem list box on initial evaluation, provide pt/family education and to maximize pt's level of independence in the home and community environment.     Pt's spiritual, cultural and educational needs considered and pt agreeable to plan of care and goals.    Anticipated barriers to physical therapy: None    Goals:   Short Term Goals: 4 weeks  - Pt will demonstrate improved R hip ROM, especially into hip ER, by at least 5 degrees for improved mobility  overall. (progressing, not met)  - Pt will demonstrate improved LE strength, especially into hip abduction and extension, by at least 1/2 grade via MMT for improved tolerance to daily activities. (progressing, not met)  - Pt will demonstrate increased hamstring flexibility bilaterally, via hamstring test, by at least 5 degrees for improved tissue extensibility in the lower extremities. (progressing, not met)  - Pt will demonstrate independence with HEP for continued improvements outside of the clinical setting. (progressing, not met)     Long Term Goals: 8 weeks   - Pt will demonstrate a negative Annika's test for improved mobility and extensibility of the IT band. (progressing, not met)  - Pt will demonstrate improved LE strength, especially into hip abduction and extension, by at least 1 full grade via MMT for improved tolerance to daily activities. (progressing, not met)  - Pt will demonstrate increased hamstring flexibility bilaterally, via hamstring test, by at least 10 degrees for improved tissue extensibility in the lower extremities. (progressing, not met)  - Pt will be able to perform all functional movements required to return to work (bending over, crossing her legs, picking something up from the floor) with minimal to no provocation of pain for return to PLOF. (progressing, not met)    Plan     Continue with established POC for improved functional mobility, increased LE strength, and increased flexibility.    Possible for Next Session: step ups (forwards and lateral), SLR, supine hip flexion, progress loyda Larsen, PT, DPT

## 2020-07-09 ENCOUNTER — CLINICAL SUPPORT (OUTPATIENT)
Dept: REHABILITATION | Facility: HOSPITAL | Age: 65
End: 2020-07-09
Payer: COMMERCIAL

## 2020-07-09 DIAGNOSIS — R26.89 DECREASED FUNCTIONAL MOBILITY: ICD-10-CM

## 2020-07-09 DIAGNOSIS — M62.9 HAMSTRING TIGHTNESS OF BOTH LOWER EXTREMITIES: ICD-10-CM

## 2020-07-09 DIAGNOSIS — R29.898 DECREASED STRENGTH OF LOWER EXTREMITY: ICD-10-CM

## 2020-07-09 DIAGNOSIS — M25.651 DECREASED RANGE OF RIGHT HIP MOVEMENT: ICD-10-CM

## 2020-07-09 PROCEDURE — 97110 THERAPEUTIC EXERCISES: CPT | Mod: PN

## 2020-07-09 PROCEDURE — 97140 MANUAL THERAPY 1/> REGIONS: CPT | Mod: PN

## 2020-07-10 NOTE — PROGRESS NOTES
Physical Therapy Daily Treatment Note     Name: Benji Ring  Clinic Number: [id number]    Therapy Diagnosis:   Encounter Diagnoses   Name Primary?    Decreased range of right hip movement     Decreased functional mobility     Hamstring tightness of both lower extremities     Decreased strength of lower extremity      Physician: Sharita Stevens MD    Visit Date: 7/13/2020     Physician Orders: PT Eval and Treat  Medical Diagnosis from Referral: M76.31 (ICD-10-CM) - It band syndrome, right  Evaluation Date: 6/29/2020  Authorization Period Expiration: 12/31/2020  Plan of Care Expiration: 8/24/2020  Visit # / Visits authorized: 4/ 20    Time In: 10:00 AM  Time Out: 10:41 AM  Total Billable Time: 41 minutes    Precautions: HTN, Hx of Breast Cancer, BRCA2 gene mutation positive, Diabetes Type II, Osteopenia    Subjective     Pt reports: that she feels okay in the morning. Continues to have issues sleeping at night but did have a little relief after last session that lasted for a few hours.    She was somewhat compliant with home exercise program.  Response to previous treatment: no change  Functional change: too soon to tell    Pain: 3/10  Location: right hip and back    Objective     Benji received therapeutic exercises to develop strength, endurance, ROM and flexibility for 26 minutes including:     Standing IT Band Stretch 2x20s B  Supine Hamstring Stretch 10x10s B  Supine Piriformis Stretch 2x20s B  Seated Piriformis Stretch 2x20s B  SL Clamshells 2x10 reps (Y Sport Loop)  Supine Hip Adduction 2x10 reps (3 sec holds) yellow ball  Supine Hip Bridges 2x10 reps B  Supine Hip Flexion with YTB x10 reps BLE (NP today)  SL Hip Abduction x15 reps B   Prone Hip Extensions + knee bent x10 reps BLE  Prone Quad Stretch with strap 2x20s BLE  Seated Hip IR with YTB 2x10 reps B (NP today - time)  Seated Hip ER with YTB 2x10 reps B (NP today - time)  Standing Gastroc/Soleus Stretch 2x20s B (slant board level 3)     Possible  for Next Session: step ups (forwards and lateral), SLR, progress bridges    Benji received the following manual therapy techniques: Myofacial release and Soft tissue Mobilization were applied for 15 minutes, including:    STM to the R IT Band/TFL  STM to R lumbar spine/paraspinals/piriformis  Cupping to the R IT Band (NP today)    Home Exercises Provided and Patient Education Provided     Education provided:   - HEP Review  - Post Exercise Soreness  - Anatomy/Physiology of the knee and surrounding musculature  - maintaining a pain free ROM as much as possible with activity  - Cupping pros/cons    Written Home Exercises Provided: Patient instructed to cont prior HEP.  Exercises were reviewed and Benji was able to demonstrate them prior to the end of the session.  Benji demonstrated good  understanding of the education provided.     See EMR under Patient Instructions for exercises provided on 6/29/2020 (initial evaluation).    Assessment     Pt tolerated therapy fairly well this morning. TTP noted along R hip with manual techniques. SI joint remains in alignment upon assessment. Continuing to emphasize stretching and strengthening up and down the kinetic chain of the LE. Introduced prone quad stretch and prone hip extensions with focus on glute activation - pt required increased verbal and manual cues for proper technique, observed L glute to be stronger compared to the contralateral. Encouraged patient to maintain a pain-free ROM with all activities and to avoid overuse, pt verbalized understanding.    Benji is progressing well towards her goals.   Pt prognosis is Good.     Pt will continue to benefit from skilled outpatient physical therapy to address the deficits listed in the problem list box on initial evaluation, provide pt/family education and to maximize pt's level of independence in the home and community environment.     Pt's spiritual, cultural and educational needs considered and pt agreeable to  plan of care and goals.    Anticipated barriers to physical therapy: None    Goals:   Short Term Goals: 4 weeks  - Pt will demonstrate improved R hip ROM, especially into hip ER, by at least 5 degrees for improved mobility overall. (progressing, not met)  - Pt will demonstrate improved LE strength, especially into hip abduction and extension, by at least 1/2 grade via MMT for improved tolerance to daily activities. (progressing, not met)  - Pt will demonstrate increased hamstring flexibility bilaterally, via hamstring test, by at least 5 degrees for improved tissue extensibility in the lower extremities. (progressing, not met)  - Pt will demonstrate independence with HEP for continued improvements outside of the clinical setting. (progressing, not met)     Long Term Goals: 8 weeks   - Pt will demonstrate a negative Annika's test for improved mobility and extensibility of the IT band. (progressing, not met)  - Pt will demonstrate improved LE strength, especially into hip abduction and extension, by at least 1 full grade via MMT for improved tolerance to daily activities. (progressing, not met)  - Pt will demonstrate increased hamstring flexibility bilaterally, via hamstring test, by at least 10 degrees for improved tissue extensibility in the lower extremities. (progressing, not met)  - Pt will be able to perform all functional movements required to return to work (bending over, crossing her legs, picking something up from the floor) with minimal to no provocation of pain for return to PLOF. (progressing, not met)    Plan     Continue with established POC for improved functional mobility, increased LE strength, and increased flexibility.    Possible for Next Session: step ups (forwards and lateral), SLR, progress loyda Larsen, PT, DPT

## 2020-07-13 ENCOUNTER — CLINICAL SUPPORT (OUTPATIENT)
Dept: REHABILITATION | Facility: HOSPITAL | Age: 65
End: 2020-07-13
Payer: COMMERCIAL

## 2020-07-13 DIAGNOSIS — R29.898 DECREASED STRENGTH OF LOWER EXTREMITY: ICD-10-CM

## 2020-07-13 DIAGNOSIS — R26.89 DECREASED FUNCTIONAL MOBILITY: ICD-10-CM

## 2020-07-13 DIAGNOSIS — M25.651 DECREASED RANGE OF RIGHT HIP MOVEMENT: ICD-10-CM

## 2020-07-13 DIAGNOSIS — M62.9 HAMSTRING TIGHTNESS OF BOTH LOWER EXTREMITIES: ICD-10-CM

## 2020-07-13 PROCEDURE — 97140 MANUAL THERAPY 1/> REGIONS: CPT | Mod: PN

## 2020-07-13 PROCEDURE — 97110 THERAPEUTIC EXERCISES: CPT | Mod: PN

## 2020-07-13 NOTE — TELEPHONE ENCOUNTER
Left message to remind pt of her appt with Dr Lai at 1pm today. Let her know that she has 2 appts scheduled for routine exam and clfu but to her to come at 1pm.   
done

## 2020-07-15 NOTE — PROGRESS NOTES
Physical Therapy Daily Treatment Note     Name: Benji Ring  Clinic Number: [id number]    Therapy Diagnosis:   Encounter Diagnoses   Name Primary?    Decreased range of right hip movement     Decreased functional mobility     Hamstring tightness of both lower extremities     Decreased strength of lower extremity      Physician: Sharita Stevens MD    Visit Date: 7/16/2020     Physician Orders: PT Eval and Treat  Medical Diagnosis from Referral: M76.31 (ICD-10-CM) - It band syndrome, right  Evaluation Date: 6/29/2020  Authorization Period Expiration: 12/31/2020  Plan of Care Expiration: 8/24/2020  Visit # / Visits authorized: 5/ 20    Time In: 12:30 PM  Time Out: 1:10 PM  Total Billable Time: 40 minutes    Precautions: HTN, Hx of Breast Cancer, BRCA2 gene mutation positive, Diabetes Type II, Osteopenia    Subjective     Pt reports: that she is feeling a little better since last session. She has had 2 really good nights since last session and is not having any problems so far today.    She was somewhat compliant with home exercise program.  Response to previous treatment: no change  Functional change: too soon to tell    Pain: 0/10  Location: right hip and back    Objective     Benji received therapeutic exercises to develop strength, endurance, ROM and flexibility for 30 minutes including:     Standing IT Band Stretch 2x20s B  Supine Hamstring Stretch 10x10s B  Supine Piriformis Stretch 2x20s B  Seated Piriformis Stretch 2x20s B  SL Clamshells 2x10 reps (Y Sport Loop)  Supine Hip Adduction 2x10 reps (3 sec holds) yellow ball (NP today)  Supine Hip Bridges x10 reps B + Hip abduction with Y sport loop  Supine Hip Flexion with YTB x10 reps BLE (NP today)  SL Hip Abduction x15 reps B   Prone Hip Extensions + knee bent x10 reps BLE  Prone Quad Stretch with strap 2x20s BLE  Seated Hip IR with RTB 2x10 reps B   Seated Hip ER with RTB 2x10 reps B  Standing Gastroc/Soleus Stretch 2x20s B (slant board level  3)  Standing Hip Abduction x13 reps BLE  Side Steps in cross walk x1 lap (Y sport loop around ankles)     Possible for Next Session: step ups (forwards and lateral), SLR, progress bridges, shuttle press     Benji received the following manual therapy techniques: Myofacial release and Soft tissue Mobilization were applied for 10 minutes, including:    STM to the R IT Band/TFL  STM to R lumbar spine/paraspinals/piriformis  Cupping to the R IT Band (NP today)    Home Exercises Provided and Patient Education Provided     Education provided:   - HEP Review  - Post Exercise Soreness  - Anatomy/Physiology of the knee and surrounding musculature  - maintaining a pain free ROM as much as possible with activity    Written Home Exercises Provided: Patient instructed to cont prior HEP.  Exercises were reviewed and Benji was able to demonstrate them prior to the end of the session.  Benji demonstrated good  understanding of the education provided.     See EMR under Patient Instructions for exercises provided on 6/29/2020 (initial evaluation).    Assessment     Introduced standing hip abduction - muscle fatigue was evident but no provocation of pain was reported. Also introduced side steps in the crosswalk with mild resistance - emphasis on proper upright posture and controlled movement. Pt demonstrated slight improvement in technique when performing prone bent knee extensions - hamstring eliminated for increased glute isolation. PT performed soft tissue mobilization to the R lumbar spine and surrounding musculature - pt tolerated well with no provocation of symptoms. Will continue to progress as able.    Benji is progressing well towards her goals.   Pt prognosis is Good.     Pt will continue to benefit from skilled outpatient physical therapy to address the deficits listed in the problem list box on initial evaluation, provide pt/family education and to maximize pt's level of independence in the home and community  environment.     Pt's spiritual, cultural and educational needs considered and pt agreeable to plan of care and goals.    Anticipated barriers to physical therapy: None    Goals:   Short Term Goals: 4 weeks  - Pt will demonstrate improved R hip ROM, especially into hip ER, by at least 5 degrees for improved mobility overall. (progressing, not met)  - Pt will demonstrate improved LE strength, especially into hip abduction and extension, by at least 1/2 grade via MMT for improved tolerance to daily activities. (progressing, not met)  - Pt will demonstrate increased hamstring flexibility bilaterally, via hamstring test, by at least 5 degrees for improved tissue extensibility in the lower extremities. (progressing, not met)  - Pt will demonstrate independence with HEP for continued improvements outside of the clinical setting. (progressing, not met)     Long Term Goals: 8 weeks   - Pt will demonstrate a negative Annika's test for improved mobility and extensibility of the IT band. (progressing, not met)  - Pt will demonstrate improved LE strength, especially into hip abduction and extension, by at least 1 full grade via MMT for improved tolerance to daily activities. (progressing, not met)  - Pt will demonstrate increased hamstring flexibility bilaterally, via hamstring test, by at least 10 degrees for improved tissue extensibility in the lower extremities. (progressing, not met)  - Pt will be able to perform all functional movements required to return to work (bending over, crossing her legs, picking something up from the floor) with minimal to no provocation of pain for return to PLOF. (progressing, not met)    Plan     Continue with established POC for improved functional mobility, increased LE strength, and increased flexibility.    Possible for Next Session: step ups (forwards and lateral), SLR, progress bridges, shuttle press     Daija Larsen, PT, DPT

## 2020-07-16 ENCOUNTER — CLINICAL SUPPORT (OUTPATIENT)
Dept: REHABILITATION | Facility: HOSPITAL | Age: 65
End: 2020-07-16
Payer: COMMERCIAL

## 2020-07-16 DIAGNOSIS — R29.898 DECREASED STRENGTH OF LOWER EXTREMITY: ICD-10-CM

## 2020-07-16 DIAGNOSIS — M62.9 HAMSTRING TIGHTNESS OF BOTH LOWER EXTREMITIES: ICD-10-CM

## 2020-07-16 DIAGNOSIS — R26.89 DECREASED FUNCTIONAL MOBILITY: ICD-10-CM

## 2020-07-16 DIAGNOSIS — M25.651 DECREASED RANGE OF RIGHT HIP MOVEMENT: ICD-10-CM

## 2020-07-16 PROCEDURE — 97110 THERAPEUTIC EXERCISES: CPT | Mod: PN

## 2020-07-16 PROCEDURE — 97140 MANUAL THERAPY 1/> REGIONS: CPT | Mod: PN

## 2020-07-16 NOTE — PROGRESS NOTES
Physical Therapy Daily Treatment Note     Name: Benji Ring  Clinic Number: [id number]    Therapy Diagnosis:   Encounter Diagnoses   Name Primary?    Decreased range of right hip movement     Decreased functional mobility     Hamstring tightness of both lower extremities     Decreased strength of lower extremity      Physician: Sharita Stevens MD    Visit Date: 7/20/2020     Physician Orders: PT Eval and Treat  Medical Diagnosis from Referral: M76.31 (ICD-10-CM) - It band syndrome, right  Evaluation Date: 6/29/2020  Authorization Period Expiration: 12/31/2020  Plan of Care Expiration: 8/24/2020  Visit # / Visits authorized: 6/ 20    Time In: 10:00 AM  Time Out: 10:40 AM  Total Billable Time: 40 minutes    Precautions: HTN, Hx of Breast Cancer, BRCA2 gene mutation positive, Diabetes Type II, Osteopenia    Subjective     Pt reports: that she is still feeling better. She is able to cross her legs easier and get in and out of bed with less difficulty.    She was somewhat compliant with home exercise program.  Response to previous treatment: no change  Functional change: able to get in and out of bed easier    Pain: 0/10  Location: right hip and back    Objective     Benji received therapeutic exercises to develop strength, endurance, ROM and flexibility for 30 minutes including:     Standing IT Band Stretch 2x20s B  Supine Hamstring Stretch 10x10s B (NP today - HEP)  Supine Piriformis Stretch 2x20s B  Seated Piriformis Stretch 2x20s B (NP today - HEP)  Supine Hip Adduction 2x10 reps (3 sec holds) yellow ball (NP today)  Supine Hip Bridges x15 reps B + Hip abduction with Y sport loop  Supine Hip Flexion with YTB x10 reps BLE (NP today)  SL Hip Abduction 2x10 reps B   SL Clamshells 2x10 reps (Y Sport Loop) (NP today)  Prone Hip Extensions + knee bent x10 reps BLE  Prone Quad Stretch with strap 2x20s BLE  Seated Hip IR with RTB 2x10 reps B   Seated Hip ER with RTB 2x10 reps B  Standing Gastroc/Soleus Stretch  2x20s B (slant board level 3)  Standing Hip Abduction x15 reps BLE   Side Steps in cross walk x1 lap (Y sport loop around ankles)  Shuttle Press 2x10 reps (2 black cords)  Shuttle Press x10 reps (1 black cord, 1 red cord)     Possible for Next Session: step ups (forwards and lateral), SLR, shuttle press SL    Benji received the following manual therapy techniques: Myofacial release and Soft tissue Mobilization were applied for 10 minutes, including:    STM to the R IT Band/TFL  STM to R lumbar spine/paraspinals/piriformis  Cupping to the R IT Band (NP today)    Home Exercises Provided and Patient Education Provided     Education provided:   - HEP Review  - Post Exercise Soreness  - Anatomy/Physiology of the knee and surrounding musculature  - maintaining a pain free ROM as much as possible with activity    Written Home Exercises Provided: Patient instructed to cont prior HEP.  Exercises were reviewed and Bneji was able to demonstrate them prior to the end of the session.  Benji demonstrated good  understanding of the education provided.     See EMR under Patient Instructions for exercises provided on 6/29/2020 (initial evaluation).    Assessment     Introduced shuttle press for increased LE strengthening, no provocation of hip pain was reported. Verbal cues for prevention of lateral trunk compensation during standing hip abduction. Pt also required verbal cues for proper upright posture during side stepping, appropriate muscle fatigue was noted. Emphasis continues to be placed on proper glute activation prior to each hip bridge and prone hip extension for protection of the lumbar spine. Encouraged patient to maintain a pain free ROM with all activity. Will continue to progress LE strengthening up and down the kinetic chain. No adverse reactions with soft tissue mobilization to the R lumbar spine/piriformis/hip.    Benji is progressing well towards her goals.   Pt prognosis is Good.     Pt will continue to  benefit from skilled outpatient physical therapy to address the deficits listed in the problem list box on initial evaluation, provide pt/family education and to maximize pt's level of independence in the home and community environment.     Pt's spiritual, cultural and educational needs considered and pt agreeable to plan of care and goals.    Anticipated barriers to physical therapy: None    Goals:   Short Term Goals: 4 weeks  - Pt will demonstrate improved R hip ROM, especially into hip ER, by at least 5 degrees for improved mobility overall. (progressing, not met)  - Pt will demonstrate improved LE strength, especially into hip abduction and extension, by at least 1/2 grade via MMT for improved tolerance to daily activities. (progressing, not met)  - Pt will demonstrate increased hamstring flexibility bilaterally, via hamstring test, by at least 5 degrees for improved tissue extensibility in the lower extremities. (progressing, not met)  - Pt will demonstrate independence with HEP for continued improvements outside of the clinical setting. (progressing, not met)     Long Term Goals: 8 weeks   - Pt will demonstrate a negative Annika's test for improved mobility and extensibility of the IT band. (progressing, not met)  - Pt will demonstrate improved LE strength, especially into hip abduction and extension, by at least 1 full grade via MMT for improved tolerance to daily activities. (progressing, not met)  - Pt will demonstrate increased hamstring flexibility bilaterally, via hamstring test, by at least 10 degrees for improved tissue extensibility in the lower extremities. (progressing, not met)  - Pt will be able to perform all functional movements required to return to work (bending over, crossing her legs, picking something up from the floor) with minimal to no provocation of pain for return to PLOF. (progressing, not met)    Plan     Continue with established POC for improved functional mobility, increased LE  strength, and increased flexibility.    Possible for Next Session: step ups (forwards and lateral), SLR, shuttle press SL    Daija Larsen PT, DPT

## 2020-07-20 ENCOUNTER — CLINICAL SUPPORT (OUTPATIENT)
Dept: REHABILITATION | Facility: HOSPITAL | Age: 65
End: 2020-07-20
Payer: COMMERCIAL

## 2020-07-20 DIAGNOSIS — M25.651 DECREASED RANGE OF RIGHT HIP MOVEMENT: ICD-10-CM

## 2020-07-20 DIAGNOSIS — M62.9 HAMSTRING TIGHTNESS OF BOTH LOWER EXTREMITIES: ICD-10-CM

## 2020-07-20 DIAGNOSIS — R26.89 DECREASED FUNCTIONAL MOBILITY: ICD-10-CM

## 2020-07-20 DIAGNOSIS — R29.898 DECREASED STRENGTH OF LOWER EXTREMITY: ICD-10-CM

## 2020-07-20 PROCEDURE — 97110 THERAPEUTIC EXERCISES: CPT | Mod: PN

## 2020-07-20 PROCEDURE — 97140 MANUAL THERAPY 1/> REGIONS: CPT | Mod: PN

## 2020-07-21 ENCOUNTER — PATIENT OUTREACH (OUTPATIENT)
Dept: ADMINISTRATIVE | Facility: HOSPITAL | Age: 65
End: 2020-07-21

## 2020-07-21 NOTE — PROGRESS NOTES
Physical Therapy Daily Treatment Note     Name: Benji Ring  Clinic Number: [id number]    Therapy Diagnosis:   Encounter Diagnoses   Name Primary?    Decreased range of right hip movement     Decreased functional mobility     Hamstring tightness of both lower extremities     Decreased strength of lower extremity      Physician: Sharita Stevens MD    Visit Date: 7/22/2020     Physician Orders: PT Eval and Treat  Medical Diagnosis from Referral: M76.31 (ICD-10-CM) - It band syndrome, right  Evaluation Date: 6/29/2020  Authorization Period Expiration: 12/31/2020  Plan of Care Expiration: 8/24/2020  Visit # / Visits authorized: 7 / 20    Time In: 10:50 AM  Time Out: 11:38 AM  Total Billable Time: 48 minutes    Precautions: HTN, Hx of Breast Cancer, BRCA2 gene mutation positive, Diabetes Type II, Osteopenia    Subjective     Pt reports: continued small improvements of symptoms. Still getting some discomfort into the posterior hip but is able to cross her legs easier and is sleeping better with her legs extended.   She was somewhat compliant with home exercise program.  Response to previous treatment: felt better  Functional change: sleeping better, crossing legs easier    Pain: 0/10  Location: right hip and back    Objective   In standing L hip high, L NH  Performed push/pull (R hip ext L hip flex) and partially corrected for reduced strain to R piriformis    Benij received therapeutic exercises to develop strength, endurance, ROM and flexibility for 38 minutes including:     (NP) Standing IT Band Stretch 2x20s B  Push/pull (R hip ext/L hip flex) 3x3s  Supine Hamstring Stretch 10x10s B (NP today - HEP)  Supine Piriformis Stretch 2x20s B  Supine Hip Adduction 2x10 reps (3 sec holds) yellow ball (NP today)  Supine Hip Bridges 2x10 reps B + Hip abduction with Y sport loop  Supine Hip Flexion with YTB x10 reps BLE (NP today)  SL Hip Abduction 2x10 reps B   SL Clamshells 2x10 reps (Y Sport Loop) (NP today)  Prone  hip extension knee straight 2x10 B LE  Prone Hip Extensions + knee bent x10 reps BLE (cues to prevent knee ER)  Prone Quad Stretch with strap 2x20s BLE  Push/pull 3x3s  Standing Hip Abduction x15 reps BLE   Shuttle Press 2x10 reps (2 black cords, 1 red cord)  Shuttle Press Single Leg 2x10 reps (1 black cord, 1 red cord)  Seated Hip IR with RTB 2x10 reps B   Seated Hip ER with RTB 2x10 reps B  Push/pull 3x3s  Standing Gastroc/Soleus Stretch 2x20s B (slant board level 3) (NP - time)  Side Steps in cross walk x1 lap (Y sport loop around ankles) (NP - time)     Possible for Next Session: step ups (forwards and lateral), SLR, shuttle press SL    Benji received the following manual therapy techniques: Myofacial release and Soft tissue Mobilization were applied for 10 minutes, including:    (NP) STM to the R IT Band/TFL  STM to R lumbar paraspinals/piriformis/QL  Cupping to the R IT Band (NP today)    Home Exercises Provided and Patient Education Provided     Education provided:   - HEP Review  - Post Exercise Soreness  - Anatomy/Physiology of the knee and surrounding musculature  - maintaining a pain free ROM as much as possible with activity    Written Home Exercises Provided: Patient instructed to cont prior HEP.  Exercises were reviewed and Benji was able to demonstrate them prior to the end of the session.  Benji demonstrated good  understanding of the education provided.     See EMR under Patient Instructions for exercises provided on 6/29/2020 (initial evaluation).    Assessment     Benji tolerated treatment well this date. She presents with overall decreased tightness along the R ITB but continued with tightness and tenderness along R piriformis and posterior to R greater trochanter. Tightness alleviated somewhat with manual techniques. Minimal tightness of lumbar paraspinals and R QL. Cues with prone hip extension with knee bent to prevent hip ER with lifting. Overall core and hip strength is slowly  improving. Will continue to progress LE strengthening up and down the kinetic chain.     Benji is progressing well towards her goals.   Pt prognosis is Good.     Pt will continue to benefit from skilled outpatient physical therapy to address the deficits listed in the problem list box on initial evaluation, provide pt/family education and to maximize pt's level of independence in the home and community environment.     Pt's spiritual, cultural and educational needs considered and pt agreeable to plan of care and goals.    Anticipated barriers to physical therapy: None    Goals:   Short Term Goals: 4 weeks  - Pt will demonstrate improved R hip ROM, especially into hip ER, by at least 5 degrees for improved mobility overall. (progressing, not met)  - Pt will demonstrate improved LE strength, especially into hip abduction and extension, by at least 1/2 grade via MMT for improved tolerance to daily activities. (progressing, not met)  - Pt will demonstrate increased hamstring flexibility bilaterally, via hamstring test, by at least 5 degrees for improved tissue extensibility in the lower extremities. (progressing, not met)  - Pt will demonstrate independence with HEP for continued improvements outside of the clinical setting. (progressing, not met)     Long Term Goals: 8 weeks   - Pt will demonstrate a negative Annika's test for improved mobility and extensibility of the IT band. (progressing, not met)  - Pt will demonstrate improved LE strength, especially into hip abduction and extension, by at least 1 full grade via MMT for improved tolerance to daily activities. (progressing, not met)  - Pt will demonstrate increased hamstring flexibility bilaterally, via hamstring test, by at least 10 degrees for improved tissue extensibility in the lower extremities. (progressing, not met)  - Pt will be able to perform all functional movements required to return to work (bending over, crossing her legs, picking something up from  the floor) with minimal to no provocation of pain for return to PLOF. (progressing, not met)    Plan     Continue with established POC for improved functional mobility, increased LE strength, and increased flexibility.    Possible for Next Session: step ups (forwards and lateral), SLR, shuttle press SL    Paula Duarte, PEDRO PABLO

## 2020-07-22 ENCOUNTER — CLINICAL SUPPORT (OUTPATIENT)
Dept: REHABILITATION | Facility: HOSPITAL | Age: 65
End: 2020-07-22
Payer: COMMERCIAL

## 2020-07-22 DIAGNOSIS — R26.89 DECREASED FUNCTIONAL MOBILITY: ICD-10-CM

## 2020-07-22 DIAGNOSIS — M62.9 HAMSTRING TIGHTNESS OF BOTH LOWER EXTREMITIES: ICD-10-CM

## 2020-07-22 DIAGNOSIS — R29.898 DECREASED STRENGTH OF LOWER EXTREMITY: ICD-10-CM

## 2020-07-22 DIAGNOSIS — M25.651 DECREASED RANGE OF RIGHT HIP MOVEMENT: ICD-10-CM

## 2020-07-22 PROCEDURE — 97110 THERAPEUTIC EXERCISES: CPT | Mod: PN,CQ

## 2020-07-22 PROCEDURE — 97140 MANUAL THERAPY 1/> REGIONS: CPT | Mod: PN,CQ

## 2020-07-24 NOTE — PROGRESS NOTES
Physical Therapy Daily Treatment Note & Re-assessment     Name: Benji Ring  Clinic Number: [id number]    Therapy Diagnosis:   Encounter Diagnoses   Name Primary?    Decreased range of right hip movement     Decreased functional mobility     Hamstring tightness of both lower extremities     Decreased strength of lower extremity      Physician: Sharita Stevens MD    Visit Date: 7/27/2020     Physician Orders: PT Eval and Treat  Medical Diagnosis from Referral: M76.31 (ICD-10-CM) - It band syndrome, right  Evaluation Date: 6/29/2020  Authorization Period Expiration: 12/31/2020  Plan of Care Expiration: 8/24/2020  Visit # / Visits authorized: 8 / 20    Time In: 10:05 AM  Time Out: 10:45 AM  Total Billable Time: 40 minutes    Precautions: HTN, Hx of Breast Cancer, BRCA2 gene mutation positive, Diabetes Type II, Osteopenia    Subjective     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 Able to get in and out of the car is much easier.    She was compliant with home exercise program.  Response to previous treatment: felt better  Functional change: sleeping better, crossing legs easier    Pain: 0/10  Location: right hip and back    Objective     Observation/Posture: non-antalgic gait pattern noted upon entry into clinic.     Hip Range of Motion:    Left active Left Passive Right active  Right Passive   Flexion 115 120 115 120   Abduction 45 45 45 45   Ext. Rotation 50 50 40 42   Int. Rotation 22 24 20 22      Special Tests:  LÓPEZ: -  FADIR: -     Range of Motion:   Knee Left active Left Passive Right Active R passive   Flexion 145 145 145 145   Extension 0 0 0 0         Lower Extremity Strength  Right LE   Left LE     Knee extension: 5/5 Knee extension: 5/5   Knee flexion: 5/5 Knee flexion: 5/5   Hip flexion: 4/5 * Hip flexion: 4+/5   Hip extension:  4+/5 Hip extension: 4+/5   Hip  abduction: 4+/5 Hip abduction: 4+/5   Hip adduction: 4+/5 Hip adduction 4+/5   Ankle dorsiflexion: 5/5 Ankle dorsiflexion: 5/5   Ankle plantarflexion: 5/5 Ankle plantarflexion: 5/5   Pain = *     Function:  - Sit <--> Stand: independent  - Bed Mobility: independent     Joint Mobility: WFL into all planes - Patellar, SI: no rotation noted upon exam     Palpation: TTP along R piriformis muscle     Sensation: intact B to light touch     Flexibility:               Hamstring Test: R = 65 degrees ; L = 65 degrees              Annkia's test: R = + ; L = +     Edema: none present     Limitation/Restriction for FOTO Upper Leg Survey     Therapist reviewed FOTO scores for Benji Ring on 7/27/2020.   FOTO documents entered into [employer] - see Media section.     Limitation Score: 35%      Benji received therapeutic exercises to develop strength, endurance, ROM and flexibility for 30 minutes including:     Standing IT Band Stretch 2x20s B  Push/pull (R hip ext/L hip flex) 3x3s (NP today - HEP)  Supine Hamstring Stretch 10x10s B (NP today - HEP)  Supine Piriformis Stretch 2x20s B  Seated Hip Adduction 3x10 reps (3 sec holds) yellow ball   Supine Hip Bridges 2x10 reps B + Hip abduction with Y sport loop  Supine Hip Flexion with YTB x10 reps BLE (NP today)  SL Hip Abduction 2x10 reps B   SL Clamshells 2x10 reps (Y Sport Loop) (NP today - time)  Prone hip extension knee straight 2x10 B LE (NP today - time)  Prone Hip Extensions + knee bent x10 reps BLE (cues to prevent knee ER)  Prone Quad Stretch with strap 2x20s BLE  (NP today - time)  Standing Hip Abduction x15 reps BLE   Standing Hip Extension x10 reps BLE  Shuttle Press 2x10 reps (3 black cords)  Shuttle Press Single Leg 2x10 reps (2 black cords)  Seated Hip IR with RTB 2x10 reps B   Seated Hip ER with RTB 2x10 reps B  Standing Gastroc/Soleus Stretch 2x20s B (slant board level 3) (NP - time)  Side Steps in cross walk x1 lap (Y sport loop around ankles) (NP - time)     Possible  for Next Session: step ups (forwards and lateral), SLR, shuttle press SL    Benji received the following manual therapy techniques: Myofacial release and Soft tissue Mobilization were applied for 10 minutes, including:    (NP) STM to the R IT Band/TFL  STM to R lumbar paraspinals/piriformis/QL  Cupping to the R IT Band (NP today)    Home Exercises Provided and Patient Education Provided     Education provided:   - HEP Review  - Post Exercise Soreness  - Anatomy/Physiology of the knee and surrounding musculature  - maintaining a pain free ROM as much as possible with activity    Written Home Exercises Provided: Patient instructed to cont prior HEP.  Exercises were reviewed and Benji was able to demonstrate them prior to the end of the session.  Benji demonstrated good  understanding of the education provided.     See EMR under Patient Instructions for exercises provided on 6/29/2020 (initial evaluation).    Assessment     Benji tolerated treatment well this date. Upon re-assessment, noted patient to demonstrate improved hamstring flexibility and increased hip abduction strength. Hip ROM remained relatively the same but is within a functional range. Tenderness to palpation remains along the R piriformis muscle. Pt continues to have slight increase in pain when the right hip flexors are tested manually. Verbal cues were required for prevention of knee external rotation with prone hip extensions. Proper upright posture was observed with all seated activities. Introduced standing hip extension - verbal cues for prevention of lateral trunk compensation. Will continue to progress as able.     Benji is progressing well towards her goals.   Pt prognosis is Good.     Pt will continue to benefit from skilled outpatient physical therapy to address the deficits listed in the problem list box on initial evaluation, provide pt/family education and to maximize pt's level of independence in the home and community  environment.     Pt's spiritual, cultural and educational needs considered and pt agreeable to plan of care and goals.    Anticipated barriers to physical therapy: None    Goals:   Short Term Goals: 4 weeks  - Pt will demonstrate improved R hip ROM, especially into hip ER, by at least 5 degrees for improved mobility overall. (progressing, not met)  - Pt will demonstrate improved LE strength, especially into hip abduction and extension, by at least 1/2 grade via MMT for improved tolerance to daily activities. (MET: 7/27/2020)  - Pt will demonstrate increased hamstring flexibility bilaterally, via hamstring test, by at least 5 degrees for improved tissue extensibility in the lower extremities. (MET: 7/27/2020)  - Pt will demonstrate independence with HEP for continued improvements outside of the clinical setting. (MET: 7/27/2020)     Long Term Goals: 8 weeks   - Pt will demonstrate a negative Annika's test for improved mobility and extensibility of the IT band. (progressing, not met)  - Pt will demonstrate improved LE strength, especially into hip abduction and extension, by at least 1 full grade via MMT for improved tolerance to daily activities. (progressing, not met)  - Pt will demonstrate increased hamstring flexibility bilaterally, via hamstring test, by at least 10 degrees for improved tissue extensibility in the lower extremities. (progressing, not met)  - Pt will be able to perform all functional movements required to return to work (bending over, crossing her legs, picking something up from the floor) with minimal to no provocation of pain for return to PLOF. (progressing, not met)    Plan     Continue with established POC for improved functional mobility, increased LE strength, and increased flexibility.    Possible for Next Session: step ups (forwards and lateral), SLR, shuttle press SL    Daija Larsen, PT, DPT

## 2020-07-27 ENCOUNTER — CLINICAL SUPPORT (OUTPATIENT)
Dept: REHABILITATION | Facility: HOSPITAL | Age: 65
End: 2020-07-27
Payer: COMMERCIAL

## 2020-07-27 DIAGNOSIS — R26.89 DECREASED FUNCTIONAL MOBILITY: ICD-10-CM

## 2020-07-27 DIAGNOSIS — M62.9 HAMSTRING TIGHTNESS OF BOTH LOWER EXTREMITIES: ICD-10-CM

## 2020-07-27 DIAGNOSIS — M25.651 DECREASED RANGE OF RIGHT HIP MOVEMENT: ICD-10-CM

## 2020-07-27 DIAGNOSIS — R29.898 DECREASED STRENGTH OF LOWER EXTREMITY: ICD-10-CM

## 2020-07-27 PROCEDURE — 97140 MANUAL THERAPY 1/> REGIONS: CPT | Mod: PN

## 2020-07-27 PROCEDURE — 97110 THERAPEUTIC EXERCISES: CPT | Mod: PN

## 2020-07-27 NOTE — PLAN OF CARE
Physical Therapy Daily Treatment Note & Re-assessment     Name: Benji Ring  Clinic Number: [id number]    Therapy Diagnosis:   Encounter Diagnoses   Name Primary?    Decreased range of right hip movement     Decreased functional mobility     Hamstring tightness of both lower extremities     Decreased strength of lower extremity      Physician: Sharita Stevens MD    Visit Date: 7/27/2020     Physician Orders: PT Eval and Treat  Medical Diagnosis from Referral: M76.31 (ICD-10-CM) - It band syndrome, right  Evaluation Date: 6/29/2020  Authorization Period Expiration: 12/31/2020  Plan of Care Expiration: 8/24/2020  Visit # / Visits authorized: 8 / 20    Time In: 10:05 AM  Time Out: 10:45 AM  Total Billable Time: 40 minutes    Precautions: HTN, Hx of Breast Cancer, BRCA2 gene mutation positive, Diabetes Type II, Osteopenia    Subjective     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 Able to get in and out of the car is much easier.    She was compliant with home exercise program.  Response to previous treatment: felt better  Functional change: sleeping better, crossing legs easier    Pain: 0/10  Location: right hip and back    Objective     Observation/Posture: non-antalgic gait pattern noted upon entry into clinic.     Hip Range of Motion:    Left active Left Passive Right active  Right Passive   Flexion 115 120 115 120   Abduction 45 45 45 45   Ext. Rotation 50 50 40 42   Int. Rotation 22 24 20 22      Special Tests:  LÓPEZ: -  FADIR: -     Range of Motion:   Knee Left active Left Passive Right Active R passive   Flexion 145 145 145 145   Extension 0 0 0 0         Lower Extremity Strength  Right LE   Left LE     Knee extension: 5/5 Knee extension: 5/5   Knee flexion: 5/5 Knee flexion: 5/5   Hip flexion: 4/5 * Hip flexion: 4+/5   Hip extension:  4+/5 Hip extension: 4+/5   Hip  abduction: 4+/5 Hip abduction: 4+/5   Hip adduction: 4+/5 Hip adduction 4+/5   Ankle dorsiflexion: 5/5 Ankle dorsiflexion: 5/5   Ankle plantarflexion: 5/5 Ankle plantarflexion: 5/5   Pain = *     Function:  - Sit <--> Stand: independent  - Bed Mobility: independent     Joint Mobility: WFL into all planes - Patellar, SI: no rotation noted upon exam     Palpation: TTP along R piriformis muscle     Sensation: intact B to light touch     Flexibility:               Hamstring Test: R = 65 degrees ; L = 65 degrees              Annika's test: R = + ; L = +     Edema: none present     Limitation/Restriction for FOTO Upper Leg Survey     Therapist reviewed FOTO scores for Benji Ring on 7/27/2020.   FOTO documents entered into [employer] - see Media section.     Limitation Score: 35%      Benji received therapeutic exercises to develop strength, endurance, ROM and flexibility for 30 minutes including:     Standing IT Band Stretch 2x20s B  Push/pull (R hip ext/L hip flex) 3x3s (NP today - HEP)  Supine Hamstring Stretch 10x10s B (NP today - HEP)  Supine Piriformis Stretch 2x20s B  Seated Hip Adduction 3x10 reps (3 sec holds) yellow ball   Supine Hip Bridges 2x10 reps B + Hip abduction with Y sport loop  Supine Hip Flexion with YTB x10 reps BLE (NP today)  SL Hip Abduction 2x10 reps B   SL Clamshells 2x10 reps (Y Sport Loop) (NP today - time)  Prone hip extension knee straight 2x10 B LE (NP today - time)  Prone Hip Extensions + knee bent x10 reps BLE (cues to prevent knee ER)  Prone Quad Stretch with strap 2x20s BLE  (NP today - time)  Standing Hip Abduction x15 reps BLE   Standing Hip Extension x10 reps BLE  Shuttle Press 2x10 reps (3 black cords)  Shuttle Press Single Leg 2x10 reps (2 black cords)  Seated Hip IR with RTB 2x10 reps B   Seated Hip ER with RTB 2x10 reps B  Standing Gastroc/Soleus Stretch 2x20s B (slant board level 3) (NP - time)  Side Steps in cross walk x1 lap (Y sport loop around ankles) (NP - time)     Possible  for Next Session: step ups (forwards and lateral), SLR, shuttle press SL    Benji received the following manual therapy techniques: Myofacial release and Soft tissue Mobilization were applied for 10 minutes, including:    (NP) STM to the R IT Band/TFL  STM to R lumbar paraspinals/piriformis/QL  Cupping to the R IT Band (NP today)    Home Exercises Provided and Patient Education Provided     Education provided:   - HEP Review  - Post Exercise Soreness  - Anatomy/Physiology of the knee and surrounding musculature  - maintaining a pain free ROM as much as possible with activity    Written Home Exercises Provided: Patient instructed to cont prior HEP.  Exercises were reviewed and Benji was able to demonstrate them prior to the end of the session.  Benji demonstrated good  understanding of the education provided.     See EMR under Patient Instructions for exercises provided on 6/29/2020 (initial evaluation).    Assessment     Benji tolerated treatment well this date. Upon re-assessment, noted patient to demonstrate improved hamstring flexibility and increased hip abduction strength. Hip ROM remained relatively the same but is within a functional range. Tenderness to palpation remains along the R piriformis muscle. Pt continues to have slight increase in pain when the right hip flexors are tested manually. Verbal cues were required for prevention of knee external rotation with prone hip extensions. Proper upright posture was observed with all seated activities. Introduced standing hip extension - verbal cues for prevention of lateral trunk compensation. Will continue to progress as able.     Benji is progressing well towards her goals.   Pt prognosis is Good.     Pt will continue to benefit from skilled outpatient physical therapy to address the deficits listed in the problem list box on initial evaluation, provide pt/family education and to maximize pt's level of independence in the home and community  environment.     Pt's spiritual, cultural and educational needs considered and pt agreeable to plan of care and goals.    Anticipated barriers to physical therapy: None    Goals:   Short Term Goals: 4 weeks  - Pt will demonstrate improved R hip ROM, especially into hip ER, by at least 5 degrees for improved mobility overall. (progressing, not met)  - Pt will demonstrate improved LE strength, especially into hip abduction and extension, by at least 1/2 grade via MMT for improved tolerance to daily activities. (MET: 7/27/2020)  - Pt will demonstrate increased hamstring flexibility bilaterally, via hamstring test, by at least 5 degrees for improved tissue extensibility in the lower extremities. (MET: 7/27/2020)  - Pt will demonstrate independence with HEP for continued improvements outside of the clinical setting. (MET: 7/27/2020)     Long Term Goals: 8 weeks   - Pt will demonstrate a negative Annika's test for improved mobility and extensibility of the IT band. (progressing, not met)  - Pt will demonstrate improved LE strength, especially into hip abduction and extension, by at least 1 full grade via MMT for improved tolerance to daily activities. (progressing, not met)  - Pt will demonstrate increased hamstring flexibility bilaterally, via hamstring test, by at least 10 degrees for improved tissue extensibility in the lower extremities. (progressing, not met)  - Pt will be able to perform all functional movements required to return to work (bending over, crossing her legs, picking something up from the floor) with minimal to no provocation of pain for return to PLOF. (progressing, not met)    Plan     Continue with established POC for improved functional mobility, increased LE strength, and increased flexibility.    Possible for Next Session: step ups (forwards and lateral), SLR, shuttle press SL    Daija Larsen, PT, DPT

## 2020-07-29 NOTE — PROGRESS NOTES
Physical Therapy Daily Treatment Note     Name: Benji Ring  Clinic Number: [id number]    Therapy Diagnosis:   Encounter Diagnoses   Name Primary?    Decreased range of right hip movement     Decreased functional mobility     Hamstring tightness of both lower extremities     Decreased strength of lower extremity      Physician: Sharita Stevens MD    Visit Date: 7/30/2020     Physician Orders: PT Eval and Treat  Medical Diagnosis from Referral: M76.31 (ICD-10-CM) - It band syndrome, right  Evaluation Date: 6/29/2020  Authorization Period Expiration: 12/31/2020  Plan of Care Expiration: 8/24/2020  Visit # / Visits authorized: 9 / 20 (re-assessed on 7/27/2020)    Time In: 10:13 AM  Time Out: 10:53 AM  Total Billable Time: 40 minutes    Precautions: HTN, Hx of Breast Cancer, BRCA2 gene mutation positive, Diabetes Type II, Osteopenia    Subjective     Pt reports: that she is continuing to feel a little better. No pain coming in today. Occasionally had pain in the front of the hip/groin but not too often, definitely improved.    She was compliant with home exercise program.  Response to previous treatment: no soreness, continues to feel better  Functional change: sleeping better, crossing legs easier    Pain: 0/10  Location: right hip and back    Objective     Benji received therapeutic exercises to develop strength, endurance, ROM and flexibility for 30 minutes including:     Standing IT Band Stretch 2x20s B (NP today - HEP)  Supine Piriformis Stretch 2x20s B  Supine Hip Bridges 2x10 reps B + Hip abduction with Y sport loop  SLR x15 reps BLE  SL Hip Abduction 2x10 reps B   Prone Hip Extensions + knee bent x10 reps BLE (cues to prevent knee ER)  Prone Quad Stretch with strap 2x20s BLE  (NP today - time)  Standing Hip Abduction 2x10 reps BLE   Standing Hip Extension 2x10 reps BLE  Shuttle Press 3x10 reps (3 black cords)  Shuttle Press Single Leg 2x10 reps (2 black cords)  Seated Hip IR with RTB 2x10 reps B (NP  today - time)  Seated Hip ER with RTB 2x10 reps B (NP today - time)  Seated Hip Adduction 3x10 reps (3 sec holds) yellow ball   Standing Gastroc/Soleus Stretch 2x20s B (slant board level 3)  Side Steps in cross walk x1 lap (Y sport loop around ankles)  Pilates Stepper 2x10 reps BLE (Level: 1.5)    Not Performed Today:  SL Clamshells 2x10 reps (Y Sport Loop) (NP today - time)  Prone hip extension knee straight 2x10 B LE (NP today - time)  Push/pull (R hip ext/L hip flex) 3x3s (NP today - HEP)  Supine Hamstring Stretch 10x10s B (NP today - HEP)  Supine Hip Flexion with YTB x10 reps BLE (NP today)     Possible for Next Session: step ups (forwards and lateral), SLR, shuttle press SL    Benji received the following manual therapy techniques: Myofacial release and Soft tissue Mobilization were applied for 10 minutes, including:    (NP) STM to the R IT Band/TFL  STM to R lumbar paraspinals/piriformis/QL  Cupping to the R IT Band (NP today)    Home Exercises Provided and Patient Education Provided     Education provided:   - HEP Review  - Post Exercise Soreness  - Anatomy/Physiology of the knee and surrounding musculature  - maintaining a pain free ROM as much as possible with activity    Written Home Exercises Provided: Patient instructed to cont prior HEP.  Exercises were reviewed and Benji was able to demonstrate them prior to the end of the session.  Benji demonstrated good  understanding of the education provided.     See EMR under Patient Instructions for exercises provided on 6/29/2020 (initial evaluation).    Assessment     Benji tolerated treatment well this date. Verbal cues for maintaining proper mechanics and upright posture with side steps in crosswalk and with standing hip abduction/extension. Introduced the pilates stepper for increased eccentric work of the glutes - pt tolerated fairly well but there was some difficulty maintaining control at first. Able to progress intensity and or reps of some  exercises without provocation of pain. Emphasis continues to be placed on strengthening up and down the entire kinetic chain while maintaining a pain free ROM. Introduced straight leg raises - pt performed well with no provocation of right hip flexor pain. PT performed soft tissue mobilization to the R piriformis - mild TTP noted along the sacral border. Will continue to progress next session.    Benji is progressing well towards her goals.   Pt prognosis is Good.     Pt will continue to benefit from skilled outpatient physical therapy to address the deficits listed in the problem list box on initial evaluation, provide pt/family education and to maximize pt's level of independence in the home and community environment.     Pt's spiritual, cultural and educational needs considered and pt agreeable to plan of care and goals.    Anticipated barriers to physical therapy: None    Goals:   Short Term Goals: 4 weeks  - Pt will demonstrate improved R hip ROM, especially into hip ER, by at least 5 degrees for improved mobility overall. (progressing, not met)  - Pt will demonstrate improved LE strength, especially into hip abduction and extension, by at least 1/2 grade via MMT for improved tolerance to daily activities. (MET: 7/27/2020)  - Pt will demonstrate increased hamstring flexibility bilaterally, via hamstring test, by at least 5 degrees for improved tissue extensibility in the lower extremities. (MET: 7/27/2020)  - Pt will demonstrate independence with HEP for continued improvements outside of the clinical setting. (MET: 7/27/2020)     Long Term Goals: 8 weeks   - Pt will demonstrate a negative Annika's test for improved mobility and extensibility of the IT band. (progressing, not met)  - Pt will demonstrate improved LE strength, especially into hip abduction and extension, by at least 1 full grade via MMT for improved tolerance to daily activities. (progressing, not met)  - Pt will demonstrate increased hamstring  flexibility bilaterally, via hamstring test, by at least 10 degrees for improved tissue extensibility in the lower extremities. (progressing, not met)  - Pt will be able to perform all functional movements required to return to work (bending over, crossing her legs, picking something up from the floor) with minimal to no provocation of pain for return to PLOF. (progressing, not met)    Plan     Continue with established POC for improved functional mobility, increased LE strength, and increased flexibility.    Possible for Next Session: step ups (forwards and lateral), SLR, shuttle press SL    Daija Larsen, PT, DPT

## 2020-07-30 ENCOUNTER — CLINICAL SUPPORT (OUTPATIENT)
Dept: REHABILITATION | Facility: HOSPITAL | Age: 65
End: 2020-07-30
Payer: COMMERCIAL

## 2020-07-30 DIAGNOSIS — R26.89 DECREASED FUNCTIONAL MOBILITY: ICD-10-CM

## 2020-07-30 DIAGNOSIS — M62.9 HAMSTRING TIGHTNESS OF BOTH LOWER EXTREMITIES: ICD-10-CM

## 2020-07-30 DIAGNOSIS — M25.651 DECREASED RANGE OF RIGHT HIP MOVEMENT: ICD-10-CM

## 2020-07-30 DIAGNOSIS — R29.898 DECREASED STRENGTH OF LOWER EXTREMITY: ICD-10-CM

## 2020-07-30 PROCEDURE — 97140 MANUAL THERAPY 1/> REGIONS: CPT | Mod: PN

## 2020-07-30 PROCEDURE — 97110 THERAPEUTIC EXERCISES: CPT | Mod: PN

## 2020-08-03 ENCOUNTER — CLINICAL SUPPORT (OUTPATIENT)
Dept: REHABILITATION | Facility: HOSPITAL | Age: 65
End: 2020-08-03
Payer: COMMERCIAL

## 2020-08-03 DIAGNOSIS — M62.9 HAMSTRING TIGHTNESS OF BOTH LOWER EXTREMITIES: ICD-10-CM

## 2020-08-03 DIAGNOSIS — M25.651 DECREASED RANGE OF RIGHT HIP MOVEMENT: ICD-10-CM

## 2020-08-03 DIAGNOSIS — R26.89 DECREASED FUNCTIONAL MOBILITY: ICD-10-CM

## 2020-08-03 DIAGNOSIS — R29.898 DECREASED STRENGTH OF LOWER EXTREMITY: ICD-10-CM

## 2020-08-03 PROCEDURE — 97110 THERAPEUTIC EXERCISES: CPT | Mod: PN

## 2020-08-03 PROCEDURE — 97140 MANUAL THERAPY 1/> REGIONS: CPT | Mod: PN

## 2020-08-03 NOTE — PROGRESS NOTES
Physical Therapy Daily Treatment Note     Name: Benji Ring  Clinic Number: [id number]    Therapy Diagnosis:   Encounter Diagnoses   Name Primary?    Decreased range of right hip movement     Decreased functional mobility     Hamstring tightness of both lower extremities     Decreased strength of lower extremity      Physician: Sharita Stevens MD    Visit Date: 8/3/2020     Physician Orders: PT Eval and Treat  Medical Diagnosis from Referral: M76.31 (ICD-10-CM) - It band syndrome, right  Evaluation Date: 6/29/2020  Authorization Period Expiration: 12/31/2020  Plan of Care Expiration: 8/24/2020  Visit # / Visits authorized: 10 / 20 (re-assessed on 7/27/2020)    Time In: 10:00 AM  Time Out: 10:40 AM  Total Billable Time: 40 minutes    Precautions: HTN, Hx of Breast Cancer, BRCA2 gene mutation positive, Diabetes Type II, Osteopenia    Subjective     Pt reports: that her leg is not doing good today. She did a lot over the weekend because she was moving things around the house and had carpet removed. Her R knee and hip are hurting but not as bad as it use to be.    She was compliant with home exercise program.  Response to previous treatment: no soreness  Functional change: sleeping better, crossing legs easier    Pain: 5/10  Location: right hip and knee    Objective     Benji received therapeutic exercises to develop strength, endurance, ROM and flexibility for 30 minutes including:     Standing IT Band Stretch 2x20s B  Supine Piriformis Stretch 3x20s B  Supine Hip Bridges 2x10 reps B + Hip abduction with Y sport loop (NP today - time)  DL > SL Hip Bridge x10 reps BLE  SLR 2x10 reps BLE  SL Hip Abduction 2x10 reps B   Prone Hip Extensions + knee straight x15 reps (knee straight today)  Prone Quad Stretch with strap 2x20s BLE  (NP today - time)  Standing Hip Abduction 2x10 reps BLE   Standing Hip Extension 2x10 reps BLE  Shuttle Press 3x10 reps (3 black cords)  Shuttle Press Single Leg 2x10 reps (2 black  cords)  Seated Hip IR with RTB 2x10 reps B (NP today - time)  Seated Hip ER with RTB 2x10 reps B (NP today - time)  Seated Hip Adduction 3x10 reps (3 sec holds) yellow ball (NP today - time)  Standing Gastroc/Soleus Stretch 2x20s B (slant board level 3)  Side Steps in cross walk x1 lap (Y sport loop around ankles) (NP today - time)  Pilates Stepper 2x10 reps BLE (Level: 2)    Not Performed Today:  SL Clamshells 2x10 reps (Y Sport Loop) (NP today - time)  Prone hip extension knee straight 2x10 B LE (NP today - time)  Push/pull (R hip ext/L hip flex) 3x3s (NP today - HEP)  Supine Hamstring Stretch 10x10s B (NP today - HEP)  Supine Hip Flexion with YTB x10 reps BLE (NP today)     Possible for Next Session: step ups (forwards and lateral), SLR, shuttle press SL    Benji received the following manual therapy techniques: Myofacial release and Soft tissue Mobilization were applied for 10 minutes, including:    (NP) STM to the R IT Band/TFL  STM to R lumbar paraspinals/piriformis/QL  Cupping to the R IT Band (NP today)    Home Exercises Provided and Patient Education Provided     Education provided:   - HEP Review  - Post Exercise Soreness  - Anatomy/Physiology of the knee and surrounding musculature  - maintaining a pain free ROM as much as possible with activity    Written Home Exercises Provided: Patient instructed to cont prior HEP.  Exercises were reviewed and Benji was able to demonstrate them prior to the end of the session.  Benji demonstrated good  understanding of the education provided.     See EMR under Patient Instructions for exercises provided on 6/29/2020 (initial evaluation).    Assessment     Benji presented with increased Right hip and knee pain. Focus was placed on maintaining a pain free ROM throughout the treatment session. Able to progress hip bridges to include single leg eccentric control emphasizing more glute activation and stability - pt tolerated well with no provocation of pain.  Emphasis was also placed on maintaining a TA contraction with all activities for increased core activation. Appropriate standing posture was noted with all standing activities. Will continue to progress with more single leg activities for increased stability and strength.    Benji is progressing well towards her goals.   Pt prognosis is Good.     Pt will continue to benefit from skilled outpatient physical therapy to address the deficits listed in the problem list box on initial evaluation, provide pt/family education and to maximize pt's level of independence in the home and community environment.     Pt's spiritual, cultural and educational needs considered and pt agreeable to plan of care and goals.    Anticipated barriers to physical therapy: None    Goals:   Short Term Goals: 4 weeks  - Pt will demonstrate improved R hip ROM, especially into hip ER, by at least 5 degrees for improved mobility overall. (progressing, not met)  - Pt will demonstrate improved LE strength, especially into hip abduction and extension, by at least 1/2 grade via MMT for improved tolerance to daily activities. (MET: 7/27/2020)  - Pt will demonstrate increased hamstring flexibility bilaterally, via hamstring test, by at least 5 degrees for improved tissue extensibility in the lower extremities. (MET: 7/27/2020)  - Pt will demonstrate independence with HEP for continued improvements outside of the clinical setting. (MET: 7/27/2020)     Long Term Goals: 8 weeks   - Pt will demonstrate a negative Annika's test for improved mobility and extensibility of the IT band. (progressing, not met)  - Pt will demonstrate improved LE strength, especially into hip abduction and extension, by at least 1 full grade via MMT for improved tolerance to daily activities. (progressing, not met)  - Pt will demonstrate increased hamstring flexibility bilaterally, via hamstring test, by at least 10 degrees for improved tissue extensibility in the lower  extremities. (progressing, not met)  - Pt will be able to perform all functional movements required to return to work (bending over, crossing her legs, picking something up from the floor) with minimal to no provocation of pain for return to PLOF. (progressing, not met)    Plan     Continue with established POC for improved functional mobility, increased LE strength, and increased flexibility.    Possible for Next Session: step ups (forwards and lateral), SLR, shuttle press SL    Daija Larsen, PT, DPT

## 2020-08-05 NOTE — PROGRESS NOTES
Physical Therapy Daily Treatment Note     Name: Benji Ring  Clinic Number: [id number]    Therapy Diagnosis:   Encounter Diagnoses   Name Primary?    Decreased range of right hip movement     Decreased functional mobility     Hamstring tightness of both lower extremities     Decreased strength of lower extremity      Physician: Sharita Stevens MD    Visit Date: 8/6/2020     Physician Orders: PT Eval and Treat  Medical Diagnosis from Referral: M76.31 (ICD-10-CM) - It band syndrome, right  Evaluation Date: 6/29/2020  Authorization Period Expiration: 12/31/2020  Plan of Care Expiration: 8/24/2020  Visit # / Visits authorized: 11 / 20 (re-assessed on 7/27/2020)    Time In: 10:20 AM  Time Out: 11:00 AM  Total Billable Time: 40 minutes    Precautions: HTN, Hx of Breast Cancer, BRCA2 gene mutation positive, Diabetes Type II, Osteopenia    Subjective     Pt reports: that she is doing pretty good this morning. She is not having any pain today. Her knee is also doing better as well, has not bothered her anymore.    She was compliant with home exercise program.  Response to previous treatment: no soreness  Functional change: sleeping better, crossing legs easier    Pain: 0/10  Location: right hip and knee    Objective     Benji received therapeutic exercises to develop strength, endurance, ROM and flexibility for 40 minutes including:     Standing IT Band Stretch 2x20s B (NP today - HEP)  Supine Piriformis Stretch 3x20s B  Supine Hip Bridges 2x10 reps B + Hip abduction with Y sport loop (NP today - time)  DL > SL Hip Bridge 2x10` reps BLE  SLR 2x10 reps BLE #1 cuff  SL Hip Abduction 2x10 reps B   Prone Hip Extensions + knee straight 2x10 reps  Prone Quad Stretch with strap 2x20s BLE  Standing Hip Abduction 2x10 reps BLE YTB  Standing Hip Extension 2x10 reps BLE YTB  Shuttle Press 3x10 reps (3 black cords)  Shuttle Press Single Leg 2x10 reps (2 black cords)  Seated Hip Adduction 3x10 reps (3 sec holds) yellow ball (NP  today - time)  Standing Gastroc/Soleus Stretch 2x20s B (slant board level 3)  Side Steps in cross walk x1 lap (Y sport loop around ankles)  Pilates Stepper 2x10 reps BLE (Level: 2.5)    Not Performed Today:  SL Clamshells 2x10 reps (Y Sport Loop) (NP today - time)  Seated Hip IR with RTB 2x10 reps B (NP today - time)  Seated Hip ER with RTB 2x10 reps B (NP today - time)  Prone hip extension knee straight 2x10 B LE (NP today - time)  Push/pull (R hip ext/L hip flex) 3x3s (NP today - HEP)  Supine Hamstring Stretch 10x10s B (NP today - HEP)  Supine Hip Flexion with YTB x10 reps BLE (NP today)     Possible for Next Session: step ups (forwards and lateral), shuttle press SL    Benji received the following manual therapy techniques: Myofacial release and Soft tissue Mobilization were applied for 10 minutes, including: (NP today)    (NP) STM to the R IT Band/TFL  STM to R lumbar paraspinals/piriformis/QL  Cupping to the R IT Band (NP today)    Home Exercises Provided and Patient Education Provided     Education provided:   - HEP Review  - Post Exercise Soreness  - Anatomy/Physiology of the knee and surrounding musculature  - maintaining a pain free ROM as much as possible with activity    Written Home Exercises Provided: Patient instructed to cont prior HEP.  Exercises were reviewed and Benji was able to demonstrate them prior to the end of the session.  Benji demonstrated good  understanding of the education provided.     See EMR under Patient Instructions for exercises provided on 6/29/2020 (initial evaluation).    Assessment     Benji tolerated therapy well this date with no exacerbation of symptoms. Emphasis continues to be placed on hip and glute strengthening within a pain free ROM. Verbal cues for proper upright posture during all standing exercises. Able to progress reps and or intensity of some exercises without provocation of pain. Initial cues for slow and controlled movements with emphasis on eccentric  control of the glutes and quads during pilates stepper. No signs of knee valgus was reported with shuttle press. Will continue to progress as able.    Benji is progressing well towards her goals.   Pt prognosis is Good.     Pt will continue to benefit from skilled outpatient physical therapy to address the deficits listed in the problem list box on initial evaluation, provide pt/family education and to maximize pt's level of independence in the home and community environment.     Pt's spiritual, cultural and educational needs considered and pt agreeable to plan of care and goals.    Anticipated barriers to physical therapy: None    Goals:   Short Term Goals: 4 weeks  - Pt will demonstrate improved R hip ROM, especially into hip ER, by at least 5 degrees for improved mobility overall. (progressing, not met)  - Pt will demonstrate improved LE strength, especially into hip abduction and extension, by at least 1/2 grade via MMT for improved tolerance to daily activities. (MET: 7/27/2020)  - Pt will demonstrate increased hamstring flexibility bilaterally, via hamstring test, by at least 5 degrees for improved tissue extensibility in the lower extremities. (MET: 7/27/2020)  - Pt will demonstrate independence with HEP for continued improvements outside of the clinical setting. (MET: 7/27/2020)     Long Term Goals: 8 weeks   - Pt will demonstrate a negative Annika's test for improved mobility and extensibility of the IT band. (progressing, not met)  - Pt will demonstrate improved LE strength, especially into hip abduction and extension, by at least 1 full grade via MMT for improved tolerance to daily activities. (progressing, not met)  - Pt will demonstrate increased hamstring flexibility bilaterally, via hamstring test, by at least 10 degrees for improved tissue extensibility in the lower extremities. (progressing, not met)  - Pt will be able to perform all functional movements required to return to work (bending over,  crossing her legs, picking something up from the floor) with minimal to no provocation of pain for return to PLOF. (progressing, not met)    Plan     Continue with established POC for improved functional mobility, increased LE strength, and increased flexibility.    Possible for Next Session: step ups (forwards and lateral), shuttle press SL    Daija Larsen, MANISH, DPT

## 2020-08-06 ENCOUNTER — CLINICAL SUPPORT (OUTPATIENT)
Dept: REHABILITATION | Facility: HOSPITAL | Age: 65
End: 2020-08-06
Payer: COMMERCIAL

## 2020-08-06 DIAGNOSIS — R26.89 DECREASED FUNCTIONAL MOBILITY: ICD-10-CM

## 2020-08-06 DIAGNOSIS — M62.9 HAMSTRING TIGHTNESS OF BOTH LOWER EXTREMITIES: ICD-10-CM

## 2020-08-06 DIAGNOSIS — M25.651 DECREASED RANGE OF RIGHT HIP MOVEMENT: ICD-10-CM

## 2020-08-06 DIAGNOSIS — R29.898 DECREASED STRENGTH OF LOWER EXTREMITY: ICD-10-CM

## 2020-08-06 PROCEDURE — 97110 THERAPEUTIC EXERCISES: CPT | Mod: PN

## 2020-08-10 NOTE — PROGRESS NOTES
Physical Therapy Daily Treatment Note     Name: Benji Ring  Clinic Number: [id number]    Therapy Diagnosis:   Encounter Diagnoses   Name Primary?    Decreased range of right hip movement     Decreased functional mobility     Hamstring tightness of both lower extremities     Decreased strength of lower extremity      Physician: Sharita Stevens MD    Visit Date: 8/11/2020     Physician Orders: PT Eval and Treat  Medical Diagnosis from Referral: M76.31 (ICD-10-CM) - It band syndrome, right  Evaluation Date: 6/29/2020  Authorization Period Expiration: 12/31/2020  Plan of Care Expiration: 8/24/2020  Visit # / Visits authorized: 12 / 20 (re-assessed on 7/27/2020)    Time In: 7:00 AM  Time Out: 7:40 AM  Total Billable Time: 40 minutes    Precautions: HTN, Hx of Breast Cancer, BRCA2 gene mutation positive, Diabetes Type II, Osteopenia    Subjective     Pt reports: that everything is feeling good this morning and she is not having any pain. She did pretty good over the weekend without any increase in hip pain.    She was compliant with home exercise program.  Response to previous treatment: no soreness  Functional change: sleeping better, crossing legs easier    Pain: 0/10  Location: right hip    Objective     Benji received therapeutic exercises to develop strength, endurance, ROM and flexibility for 40 minutes including:     Standing IT Band Stretch 2x20s B (NP today - HEP)  Supine Piriformis Stretch 3x20s B  Supine Hip Bridges 2x10 reps B + Hip abduction with Y sport loop (NP today - time)  DL > SL Hip Bridge 2x10 reps BLE  SLR 2x10 reps BLE #1 cuff (NP today - time)  SL Hip Abduction 2x10 reps B   Prone Hip Extensions + knee straight 2x10 reps  Prone Quad Stretch with strap 2x20s BLE  Standing Hip Abduction 2x10 reps BLE Y sport loop  Standing Hip Extension 2x10 reps BLE Y sport loop  Shuttle Press 3x10 reps (3 black cords)  Shuttle Press Single Leg 2x10 reps (2 black cords)  Shuttle Press SL x10 reps BLE (1  black cord)  Seated Hip Adduction 3x10 reps (3 sec holds) yellow ball (NP today - time)  Standing Gastroc/Soleus Stretch 2x20s B (slant board level 3)  Side Steps in cross walk x1 lap (Y sport loop around ankles)  Pilates Stepper 2x10 reps BLE (Level: 2.5)  SLS Re-bounder Toss 2x10 reps (forward) + red med ball BLE    Not Performed Today:  SL Clamshells 2x10 reps (Y Sport Loop) (NP today - time)  Seated Hip IR with RTB 2x10 reps B (NP today - time)  Seated Hip ER with RTB 2x10 reps B (NP today - time)  Prone hip extension knee straight 2x10 B LE (NP today - time)  Push/pull (R hip ext/L hip flex) 3x3s (NP today - HEP)  Supine Hamstring Stretch 10x10s B (NP today - HEP)  Supine Hip Flexion with YTB x10 reps BLE (NP today)     Possible for Next Session: Heel Taps off step, lateral re-bounder toss, hamstring stretch, RDL's    Benji received the following manual therapy techniques: Myofacial release and Soft tissue Mobilization were applied for 10 minutes, including: (NP today)    (NP) STM to the R IT Band/TFL  STM to R lumbar paraspinals/piriformis/QL  Cupping to the R IT Band (NP today)    Home Exercises Provided and Patient Education Provided     Education provided:   - HEP Review  - Post Exercise Soreness  - Anatomy/Physiology of the knee and surrounding musculature  - maintaining a pain free ROM as much as possible with activity    Written Home Exercises Provided: Patient instructed to cont prior HEP.  Exercises were reviewed and Benji was able to demonstrate them prior to the end of the session.  [employer]mitory[employer]ian demonstrated good  understanding of the education provided.     See EMR under Patient Instructions for exercises provided on 6/29/2020 (initial evaluation).    Assessment     Benji tolerated therapy well this date with no exacerbation of symptoms. Posterior chain strengthening continues to be the biggest focus at this point. Introduced side lying shuttle press for increased isolation of the hip abductors and  glutes - pt performed well with no provocation of pain or symptoms. Moderate difficulty noted with re-bounder toss but improved with increased reps. The patient will continue to benefit from LE strengthening up and down the kinetic chain as well as incorporating more balance based activities.    Benji is progressing well towards her goals.   Pt prognosis is Good.     Pt will continue to benefit from skilled outpatient physical therapy to address the deficits listed in the problem list box on initial evaluation, provide pt/family education and to maximize pt's level of independence in the home and community environment.     Pt's spiritual, cultural and educational needs considered and pt agreeable to plan of care and goals.    Anticipated barriers to physical therapy: None    Goals:   Short Term Goals: 4 weeks  - Pt will demonstrate improved R hip ROM, especially into hip ER, by at least 5 degrees for improved mobility overall. (progressing, not met)  - Pt will demonstrate improved LE strength, especially into hip abduction and extension, by at least 1/2 grade via MMT for improved tolerance to daily activities. (MET: 7/27/2020)  - Pt will demonstrate increased hamstring flexibility bilaterally, via hamstring test, by at least 5 degrees for improved tissue extensibility in the lower extremities. (MET: 7/27/2020)  - Pt will demonstrate independence with HEP for continued improvements outside of the clinical setting. (MET: 7/27/2020)     Long Term Goals: 8 weeks   - Pt will demonstrate a negative Annika's test for improved mobility and extensibility of the IT band. (progressing, not met)  - Pt will demonstrate improved LE strength, especially into hip abduction and extension, by at least 1 full grade via MMT for improved tolerance to daily activities. (progressing, not met)  - Pt will demonstrate increased hamstring flexibility bilaterally, via hamstring test, by at least 10 degrees for improved tissue extensibility in  the lower extremities. (progressing, not met)  - Pt will be able to perform all functional movements required to return to work (bending over, crossing her legs, picking something up from the floor) with minimal to no provocation of pain for return to PLOF. (progressing, not met)    Plan     Continue with established POC for improved functional mobility, increased LE strength, and increased flexibility.    Possible for Next Session: Heel Taps off step, lateral re-bounder toss, hamstring stretch, RDL's    Daija Larsen, PT, DPT

## 2020-08-11 ENCOUNTER — CLINICAL SUPPORT (OUTPATIENT)
Dept: REHABILITATION | Facility: HOSPITAL | Age: 65
End: 2020-08-11
Payer: COMMERCIAL

## 2020-08-11 DIAGNOSIS — R26.89 DECREASED FUNCTIONAL MOBILITY: ICD-10-CM

## 2020-08-11 DIAGNOSIS — R29.898 DECREASED STRENGTH OF LOWER EXTREMITY: ICD-10-CM

## 2020-08-11 DIAGNOSIS — M62.9 HAMSTRING TIGHTNESS OF BOTH LOWER EXTREMITIES: ICD-10-CM

## 2020-08-11 DIAGNOSIS — M25.651 DECREASED RANGE OF RIGHT HIP MOVEMENT: ICD-10-CM

## 2020-08-11 PROCEDURE — 97110 THERAPEUTIC EXERCISES: CPT | Mod: PN

## 2020-08-14 ENCOUNTER — CLINICAL SUPPORT (OUTPATIENT)
Dept: REHABILITATION | Facility: HOSPITAL | Age: 65
End: 2020-08-14
Payer: COMMERCIAL

## 2020-08-14 DIAGNOSIS — R26.89 DECREASED FUNCTIONAL MOBILITY: ICD-10-CM

## 2020-08-14 DIAGNOSIS — M62.9 HAMSTRING TIGHTNESS OF BOTH LOWER EXTREMITIES: ICD-10-CM

## 2020-08-14 DIAGNOSIS — M25.651 DECREASED RANGE OF RIGHT HIP MOVEMENT: ICD-10-CM

## 2020-08-14 DIAGNOSIS — R29.898 DECREASED STRENGTH OF LOWER EXTREMITY: ICD-10-CM

## 2020-08-14 PROCEDURE — 97110 THERAPEUTIC EXERCISES: CPT | Mod: PN,CQ

## 2020-08-14 NOTE — PROGRESS NOTES
Physical Therapy Daily Treatment Note     Name: Benji Ring  Clinic Number: [id number]    Therapy Diagnosis:   Encounter Diagnoses   Name Primary?    Decreased range of right hip movement     Decreased functional mobility     Hamstring tightness of both lower extremities     Decreased strength of lower extremity      Physician: Sharita Stevens MD    Visit Date: 8/14/2020     Physician Orders: PT Eval and Treat  Medical Diagnosis from Referral: M76.31 (ICD-10-CM) - It band syndrome, right  Evaluation Date: 6/29/2020  Authorization Period Expiration: 12/31/2020  Plan of Care Expiration: 8/24/2020  Visit # / Visits authorized: 13 / 20 (re-assessed on 7/27/2020)    Time In: 9:15 AM  Time Out: 10:01 AM  Total Billable Time: 45 minutes    Precautions: HTN, Hx of Breast Cancer, BRCA2 gene mutation positive, Diabetes Type II, Osteopenia    Subjective     Pt reports: an increase of R knee pain this past Wednesday after sitting for a very long time, which also included some occasional lumbar spasms. Mild stiffness and soreness into the R hip.    She was compliant with home exercise program.  Response to previous treatment: no soreness  Functional change: sleeping better, crossing legs easier    Pain: 0/10  Location: right hip    Objective     Benji received therapeutic exercises to develop strength, endurance, ROM and flexibility for 45 minutes including:     Standing IT Band Stretch 2x20s B (NP today - HEP)  Supine Piriformis Stretch 3x20s B  Supine Hip Bridges 2x10 reps B + Hip abduction with Y sport loop (NP today - time)  DL > SL Hip Bridge 2x10 reps BLE  SLR 2x10 reps BLE #1 cuff (NP today - time)  SL Hip Abduction 2x10 reps B   Prone Hip Extensions + knee straight 2x10 reps  Prone Quad Stretch with strap 2x20s BLE  Push/pull (R hip ext/L hip flex) 3x3s - performed due to onset of lumbar symptoms after prone exercises  Standing Hip Abduction 2x10 reps BLE Y sport loop  Standing Hip Extension 2x10 reps BLE Y  sport loop  Shuttle Press 3x10 reps (3 black cords)  Shuttle Press Single Leg 2x10 reps (2 black cords)  Shuttle Press SL x10 reps BLE (1 black cord)  Standing Gastroc/Soleus Stretch 2x20s B (slant board level 3)  Side Steps in cross walk x1 lap (Y sport loop around ankles)  Pilates Stepper 2x10 reps BLE (2 springs L3)  SLS Re-bounder Toss 2x10 reps (forward) + red med ball BLE    Not Performed Today:  SL Clamshells 2x10 reps (Y Sport Loop) (NP today - time)  Seated Hip Adduction 3x10 reps (3 sec holds) yellow ball (NP today - time)  Seated Hip IR with RTB 2x10 reps B (NP today - time)  Seated Hip ER with RTB 2x10 reps B (NP today - time)  Prone hip extension knee straight 2x10 B LE (NP today - time)  Supine Hip Flexion with YTB x10 reps BLE (NP today)     Possible for Next Session: Heel Taps off step, lateral re-bounder toss, hamstring stretch, RDL's    Benji received the following manual therapy techniques: Myofacial release and Soft tissue Mobilization were applied for 10 minutes, including: (NP today)    (NP) STM to the R IT Band/TFL  STM to R lumbar paraspinals/piriformis/QL  Cupping to the R IT Band (NP today)    Home Exercises Provided and Patient Education Provided     Education provided:   - HEP Review  - Post Exercise Soreness  - Anatomy/Physiology of the knee and surrounding musculature  - maintaining a pain free ROM as much as possible with activity    Written Home Exercises Provided: Patient instructed to cont prior HEP.  Exercises were reviewed and Benji was able to demonstrate them prior to the end of the session.  Lashaychristian demonstrated good  understanding of the education provided.     See EMR under Patient Instructions for exercises provided on 6/29/2020 (initial evaluation).    Assessment     Benji tolerated treatment well overall this date without exacerbation of symptoms into the R hip. Slight onset of lumbar discomfort after prone exercises however alleviated with push/pull. Patient  required increased verbal cues for proper technique of double to single leg bridges. Mild balance impairments with single leg re-bounder toss but improved with increased reps. Remains motivated to improve and compliant with HEP. The patient will continue to benefit from LE strengthening up and down the kinetic chain as well as incorporating more balance based activities.    Benji is progressing well towards her goals.   Pt prognosis is Good.     Pt will continue to benefit from skilled outpatient physical therapy to address the deficits listed in the problem list box on initial evaluation, provide pt/family education and to maximize pt's level of independence in the home and community environment.     Pt's spiritual, cultural and educational needs considered and pt agreeable to plan of care and goals.    Anticipated barriers to physical therapy: None    Goals:   Short Term Goals: 4 weeks  - Pt will demonstrate improved R hip ROM, especially into hip ER, by at least 5 degrees for improved mobility overall. (progressing, not met)  - Pt will demonstrate improved LE strength, especially into hip abduction and extension, by at least 1/2 grade via MMT for improved tolerance to daily activities. (MET: 7/27/2020)  - Pt will demonstrate increased hamstring flexibility bilaterally, via hamstring test, by at least 5 degrees for improved tissue extensibility in the lower extremities. (MET: 7/27/2020)  - Pt will demonstrate independence with HEP for continued improvements outside of the clinical setting. (MET: 7/27/2020)     Long Term Goals: 8 weeks   - Pt will demonstrate a negative Annika's test for improved mobility and extensibility of the IT band. (progressing, not met)  - Pt will demonstrate improved LE strength, especially into hip abduction and extension, by at least 1 full grade via MMT for improved tolerance to daily activities. (progressing, not met)  - Pt will demonstrate increased hamstring flexibility bilaterally,  via hamstring test, by at least 10 degrees for improved tissue extensibility in the lower extremities. (progressing, not met)  - Pt will be able to perform all functional movements required to return to work (bending over, crossing her legs, picking something up from the floor) with minimal to no provocation of pain for return to PLOF. (progressing, not met)    Plan     Continue with established POC for improved functional mobility, increased LE strength, and increased flexibility.    Possible for Next Session: Heel Taps off step, lateral re-bounder toss, hamstring stretch, RDL's    Paula Duarte, PEDRO PABLO

## 2020-08-17 NOTE — PROGRESS NOTES
Physical Therapy Daily Treatment Note     Name: Benji Ring  Clinic Number: [id number]    Therapy Diagnosis:   Encounter Diagnoses   Name Primary?    Decreased range of right hip movement     Decreased functional mobility     Hamstring tightness of both lower extremities     Decreased strength of lower extremity      Physician: Sharita Stevens MD    Visit Date: 8/18/2020     Physician Orders: PT Eval and Treat  Medical Diagnosis from Referral: M76.31 (ICD-10-CM) - It band syndrome, right  Evaluation Date: 6/29/2020  Authorization Period Expiration: 12/31/2020  Plan of Care Expiration: 8/24/2020  Visit # / Visits authorized: 14 / 20 (re-assessed on 7/27/2020)    Time In: 7:00 AM  Time Out: 7:42 AM  Total Billable Time: 42 minutes    Precautions: HTN, Hx of Breast Cancer, BRCA2 gene mutation positive, Diabetes Type II, Osteopenia    Subjective     Pt reports: that she is continuing to feel pretty good. No pain coming in this morning. She is able to get in and out of the car without difficulty. Her low back is bothering her more often than anything but not too bad.    She was compliant with home exercise program.  Response to previous treatment: no soreness  Functional change: sleeping better, crossing legs easier, easier getting in and out of the car    Pain: 0/10  Location: right hip    Objective     Benji received therapeutic exercises to develop strength, endurance, ROM and flexibility for 42 minutes including:     Standing IT Band Stretch 2x20s B (NP today - HEP)  Supine Piriformis Stretch 3x20s B  Supine Hip Bridges 2x10 reps B + Hip abduction with Y sport loop (NP today - time)  DL > SL Hip Bridge 2x10 reps BLE  SLR 2x10 reps BLE #1 cuff (NP today - time)  SL Hip Abduction 2x10 reps B   Prone Hip Extensions + knee straight 2x10 reps  Prone Quad Stretch with strap 2x20s BLE  Push/pull (R hip ext/L hip flex) 3x3s  Standing Hip Abduction 2x10 reps BLE Y sport loop  Standing Hip Extension 2x10 reps BLE Y  sport loop  Shuttle Press 3x10 reps (3 black cords)  Shuttle Press Single Leg 2x10 reps (2 black cords)  Shuttle Press SL x15 reps BLE (1 black cord)  Standing Gastroc/Soleus Stretch 2x20s B (slant board level 3)  Side Steps in cross walk x1 lap (Y sport loop around ankles)  Pilates Stepper 2x10 reps BLE (2 springs L3)  SLS Re-bounder Toss 2x10 reps (forward) + red med ball BLE + lateral     Not Performed Today:  SL Clamshells 2x10 reps (Y Sport Loop) (NP today - time)  Seated Hip Adduction 3x10 reps (3 sec holds) yellow ball (NP today - time)  Seated Hip IR with RTB 2x10 reps B (NP today - time)  Seated Hip ER with RTB 2x10 reps B (NP today - time)  Prone hip extension knee straight 2x10 B LE (NP today - time)  Supine Hip Flexion with YTB x10 reps BLE (NP today)     Possible for Next Session: Heel Taps off step, lateral re-bounder toss, hamstring stretch, RDL's    Benji received the following manual therapy techniques: Myofacial release and Soft tissue Mobilization were applied for 10 minutes, including: (NP today)    (NP) STM to the R IT Band/TFL  STM to R lumbar paraspinals/piriformis/QL  Cupping to the R IT Band (NP today)    Home Exercises Provided and Patient Education Provided     Education provided:   - HEP Review  - Post Exercise Soreness  - Anatomy/Physiology of the knee and surrounding musculature  - maintaining a pain free ROM as much as possible with activity    Written Home Exercises Provided: Patient instructed to cont prior HEP.  Exercises were reviewed and Navneetjulian was able to demonstrate them prior to the end of the session.  Benji demonstrated good  understanding of the education provided.     See EMR under Patient Instructions for exercises provided on 6/29/2020 (initial evaluation).    Assessment     Benji demonstrated moderate difficulty performing SLS balance combined with re-bounder toss; however, improvement was noted with increased reps/time. Verbal cues were initially required for  proper upright posture and prevention of upper trap compensation with pilates stepper. No provocation of pain was reported during SL shuttle press. Appropriate fatigue was observed at the conclusion of the treatment session. Pain was not elicited throughout the treatment today. Will continue to progress LE strengthening up and down the kinetic chain. Potential discharge at the end of the week or early next week.    Benji is progressing well towards her goals.   Pt prognosis is Good.     Pt will continue to benefit from skilled outpatient physical therapy to address the deficits listed in the problem list box on initial evaluation, provide pt/family education and to maximize pt's level of independence in the home and community environment.     Pt's spiritual, cultural and educational needs considered and pt agreeable to plan of care and goals.    Anticipated barriers to physical therapy: None    Goals:   Short Term Goals: 4 weeks  - Pt will demonstrate improved R hip ROM, especially into hip ER, by at least 5 degrees for improved mobility overall. (progressing, not met)  - Pt will demonstrate improved LE strength, especially into hip abduction and extension, by at least 1/2 grade via MMT for improved tolerance to daily activities. (MET: 7/27/2020)  - Pt will demonstrate increased hamstring flexibility bilaterally, via hamstring test, by at least 5 degrees for improved tissue extensibility in the lower extremities. (MET: 7/27/2020)  - Pt will demonstrate independence with HEP for continued improvements outside of the clinical setting. (MET: 7/27/2020)     Long Term Goals: 8 weeks   - Pt will demonstrate a negative Annika's test for improved mobility and extensibility of the IT band. (progressing, not met)  - Pt will demonstrate improved LE strength, especially into hip abduction and extension, by at least 1 full grade via MMT for improved tolerance to daily activities. (progressing, not met)  - Pt will demonstrate  increased hamstring flexibility bilaterally, via hamstring test, by at least 10 degrees for improved tissue extensibility in the lower extremities. (progressing, not met)  - Pt will be able to perform all functional movements required to return to work (bending over, crossing her legs, picking something up from the floor) with minimal to no provocation of pain for return to PLOF. (progressing, not met)    Plan     Continue with established POC for improved functional mobility, increased LE strength, and increased flexibility.    Possible for Next Session: Heel Taps off step, hamstring stretch, RDL's    Daija Larsen PT, DPT

## 2020-08-18 ENCOUNTER — CLINICAL SUPPORT (OUTPATIENT)
Dept: REHABILITATION | Facility: HOSPITAL | Age: 65
End: 2020-08-18
Payer: COMMERCIAL

## 2020-08-18 DIAGNOSIS — M62.9 HAMSTRING TIGHTNESS OF BOTH LOWER EXTREMITIES: ICD-10-CM

## 2020-08-18 DIAGNOSIS — R29.898 DECREASED STRENGTH OF LOWER EXTREMITY: ICD-10-CM

## 2020-08-18 DIAGNOSIS — R26.89 DECREASED FUNCTIONAL MOBILITY: ICD-10-CM

## 2020-08-18 DIAGNOSIS — M25.651 DECREASED RANGE OF RIGHT HIP MOVEMENT: ICD-10-CM

## 2020-08-18 PROCEDURE — 97110 THERAPEUTIC EXERCISES: CPT | Mod: PN

## 2020-08-19 NOTE — PROGRESS NOTES
Physical Therapy Daily Treatment Note - Re-assessment and Discharge Summary     Name: Benji Ring  Clinic Number: [id number]    Therapy Diagnosis:   Encounter Diagnoses   Name Primary?    Decreased range of right hip movement     Decreased functional mobility     Hamstring tightness of both lower extremities     Decreased strength of lower extremity      Physician: Sharita Stevens MD    Visit Date: 8/20/2020     Physician Orders: PT Eval and Treat  Medical Diagnosis from Referral: M76.31 (ICD-10-CM) - It band syndrome, right  Evaluation Date: 6/29/2020  Authorization Period Expiration: 12/31/2020  Plan of Care Expiration: 8/24/2020  Visit # / Visits authorized: 15 / 20 (re-assessed on 7/27/2020) (re-assessed on 8/20/2020)    Time In: 8:00 AM  Time Out: 8:40 AM  Total Billable Time: 40 minutes    Date of Last visit: 8/20/2020  Total Visits Received: 15/20  Cancelled Visits: 2  No Show Visits: 0    Precautions: HTN, Hx of Breast Cancer, BRCA2 gene mutation positive, Diabetes Type II, Osteopenia    Subjective     Pt reports: that her hip has definitely felt much better since beginning therapy. She feels comfortable with the exercises and performing them on her own. Her back is still hit or miss but manageable. Feels like she is able to perform all necessary activities at school.     She was compliant with home exercise program.  Response to previous treatment: no soreness  Functional change: sleeping better, crossing legs easier, easier getting in and out of the car    Pain: 0/10  Location: right hip    Objective     Observation/Posture: non-antalgic gait pattern noted upon entry into clinic.     Hip Range of Motion:    Left active Left Passive Right active  Right Passive   Flexion 115 120 115 120   Abduction 45 45 45 45   Ext. Rotation 50 50 40 42   Int. Rotation 22 24 20 22      Special Tests:  LÓPEZ: -  FADIR: -     Range of Motion:   Knee Left active Left Passive Right Active R passive   Flexion 145 145 145  145   Extension 0 0 0 0         Lower Extremity Strength  Right LE   Left LE     Knee extension: 5/5 Knee extension: 5/5   Knee flexion: 5/5 Knee flexion: 5/5   Hip flexion: 4+/5 * (very mild) Hip flexion: 4+/5   Hip extension:  4+/5 Hip extension: 4+/5   Hip abduction: 5/5 Hip abduction: 5/5   Hip adduction: 5/5 Hip adduction 5/5   Ankle dorsiflexion: 5/5 Ankle dorsiflexion: 5/5   Ankle plantarflexion: 5/5 Ankle plantarflexion: 5/5   Pain = *     Function:  - Sit <--> Stand: independent  - Bed Mobility: independent     Joint Mobility: WFL into all planes - Patellar, SI: no rotation noted upon exam     Palpation: Mild TTP along R piriformis     Sensation: intact B to light touch     Flexibility:               Hamstring Test: R = 70 degrees ; L = 70 degrees              Annika's test: R = - ; L = -     Edema: none present     Limitation/Restriction for FOTO Upper Leg Survey     Therapist reviewed FOTO scores for Benji Ring on 8/20/2020.   FOTO documents entered into [employer] - see Media section.     Limitation Score: 9%        Benji received therapeutic exercises to develop strength, endurance, ROM and flexibility for 40 minutes including:     Standing IT Band Stretch 2x20s B (NP today - HEP)  Supine Piriformis Stretch 3x20s B  Supine Hip Bridges 2x10 reps B + Hip abduction with Y sport loop (NP today - time)  DL > SL Hip Bridge 2x10 reps BLE  SLR 2x10 reps BLE #1 cuff (NP today - time)  SL Hip Abduction 2x10 reps B   Prone Hip Extensions + knee straight 2x10 reps  Prone Quad Stretch with strap 2x20s BLE  Push/pull (R hip ext/L hip flex) 3x3s (NP today)    Standing Hip Abduction 2x10 reps BLE Y sport loop  Standing Hip Extension 2x10 reps BLE Y sport loop  Shuttle Press 3x10 reps (3 black cords)  Shuttle Press Single Leg 2x10 reps (2 black cords)  Shuttle Press SL x15 reps BLE (1 black cord)  Standing Gastroc/Soleus Stretch 2x20s B (slant board level 3)  Side Steps in cross walk x1 lap (Y sport loop around  ankles)  Pilates Stepper 2x10 reps BLE (2 springs L3)  SLS Re-bounder Toss 2x10 reps (forward) + red med ball BLE + lateral       Not Performed Today:  SL Clamshells 2x10 reps (Y Sport Loop) (NP today - time)  Seated Hip Adduction 3x10 reps (3 sec holds) yellow ball (NP today - time)  Seated Hip IR with RTB 2x10 reps B (NP today - time)  Seated Hip ER with RTB 2x10 reps B (NP today - time)  Prone hip extension knee straight 2x10 B LE (NP today - time)  Supine Hip Flexion with YTB x10 reps BLE (NP today)      Benji received the following manual therapy techniques: Myofacial release and Soft tissue Mobilization were applied for 10 minutes, including: (NP today)    (NP) STM to the R IT Band/TFL  STM to R lumbar paraspinals/piriformis/QL  Cupping to the R IT Band (NP today)    Home Exercises Provided and Patient Education Provided     Education provided:   - HEP Review and Administration  - Post Exercise Soreness  - maintaining a pain free ROM as much as possible with activity    Written Home Exercises Provided: Patient instructed to cont prior HEP.  Exercises were reviewed and Navneetritorychristian was able to demonstrate them prior to the end of the session.  Navneetjulian demonstrated good  understanding of the education provided.     See EMR under Patient Instructions for exercises provided on 6/29/2020 (initial evaluation) and 8/20/2020.    Assessment     Benji presents with improved functional mobility overall and increased LE strength upon re-assessment. The patient does not demonstrate any provocation of pain during activity. The patient has met 7/8 goals and is comfortable returning to school and being able to perform all necessary tasks such as bending over and picking things up. An extensive HEP was given for continued improvements outside of the clinical setting. Noted hamstring flexibility to have improved since beginning treatment as well. At this point, the patient is discharged from skilled outpatient PT  services.    Discharge reason: Patient is now asymptomatic and Patient requested discharge    Benji is progressing well towards her goals.   Pt prognosis is Good.     Pt will continue to benefit from skilled outpatient physical therapy to address the deficits listed in the problem list box on initial evaluation, provide pt/family education and to maximize pt's level of independence in the home and community environment.     Pt's spiritual, cultural and educational needs considered and pt agreeable to plan of care and goals.    Anticipated barriers to physical therapy: None    Goals:   Short Term Goals: 4 weeks  - Pt will demonstrate improved R hip ROM, especially into hip ER, by at least 5 degrees for improved mobility overall. (Not Met)  - Pt will demonstrate improved LE strength, especially into hip abduction and extension, by at least 1/2 grade via MMT for improved tolerance to daily activities. (MET: 7/27/2020)  - Pt will demonstrate increased hamstring flexibility bilaterally, via hamstring test, by at least 5 degrees for improved tissue extensibility in the lower extremities. (MET: 7/27/2020)  - Pt will demonstrate independence with HEP for continued improvements outside of the clinical setting. (MET: 7/27/2020)     Long Term Goals: 8 weeks   - Pt will demonstrate a negative Annika's test for improved mobility and extensibility of the IT band. (MET: 8/20/2020)  - Pt will demonstrate improved LE strength, especially into hip abduction and extension, by at least 1 full grade via MMT for improved tolerance to daily activities. (MET: 8/20/2020)  - Pt will demonstrate increased hamstring flexibility bilaterally, via hamstring test, by at least 10 degrees for improved tissue extensibility in the lower extremities. (MET: 8/20/2020)  - Pt will be able to perform all functional movements required to return to work (bending over, crossing her legs, picking something up from the floor) with minimal to no provocation of  pain for return to PLOF. (MET: 8/20/2020)    Plan     This patient is discharged from skilled outpatient Physical Therapy services.    Daija Larsen, PT, DPT, Cert.DN

## 2020-08-20 ENCOUNTER — CLINICAL SUPPORT (OUTPATIENT)
Dept: REHABILITATION | Facility: HOSPITAL | Age: 65
End: 2020-08-20
Payer: COMMERCIAL

## 2020-08-20 DIAGNOSIS — R29.898 DECREASED STRENGTH OF LOWER EXTREMITY: ICD-10-CM

## 2020-08-20 DIAGNOSIS — M25.651 DECREASED RANGE OF RIGHT HIP MOVEMENT: ICD-10-CM

## 2020-08-20 DIAGNOSIS — R26.89 DECREASED FUNCTIONAL MOBILITY: ICD-10-CM

## 2020-08-20 DIAGNOSIS — M62.9 HAMSTRING TIGHTNESS OF BOTH LOWER EXTREMITIES: ICD-10-CM

## 2020-08-20 PROCEDURE — 97110 THERAPEUTIC EXERCISES: CPT | Mod: PN

## 2020-08-20 NOTE — PLAN OF CARE
Physical Therapy Daily Treatment Note - Re-assessment and Discharge Summary     Name: Benji Ring  Clinic Number: [id number]    Therapy Diagnosis:   Encounter Diagnoses   Name Primary?    Decreased range of right hip movement     Decreased functional mobility     Hamstring tightness of both lower extremities     Decreased strength of lower extremity      Physician: Sharita Stevens MD    Visit Date: 8/20/2020     Physician Orders: PT Eval and Treat  Medical Diagnosis from Referral: M76.31 (ICD-10-CM) - It band syndrome, right  Evaluation Date: 6/29/2020  Authorization Period Expiration: 12/31/2020  Plan of Care Expiration: 8/24/2020  Visit # / Visits authorized: 15 / 20 (re-assessed on 7/27/2020) (re-assessed on 8/20/2020)    Time In: 8:00 AM  Time Out: 8:40 AM  Total Billable Time: 40 minutes    Date of Last visit: 8/20/2020  Total Visits Received: 15/20  Cancelled Visits: 2  No Show Visits: 0    Precautions: HTN, Hx of Breast Cancer, BRCA2 gene mutation positive, Diabetes Type II, Osteopenia    Subjective     Pt reports: that her hip has definitely felt much better since beginning therapy. She feels comfortable with the exercises and performing them on her own. Her back is still hit or miss but manageable. Feels like she is able to perform all necessary activities at school.     She was compliant with home exercise program.  Response to previous treatment: no soreness  Functional change: sleeping better, crossing legs easier, easier getting in and out of the car    Pain: 0/10  Location: right hip    Objective     Observation/Posture: non-antalgic gait pattern noted upon entry into clinic.     Hip Range of Motion:    Left active Left Passive Right active  Right Passive   Flexion 115 120 115 120   Abduction 45 45 45 45   Ext. Rotation 50 50 40 42   Int. Rotation 22 24 20 22      Special Tests:  LÓPEZ: -  FADIR: -     Range of Motion:   Knee Left active Left Passive Right Active R passive   Flexion 145 145 145  145   Extension 0 0 0 0         Lower Extremity Strength  Right LE   Left LE     Knee extension: 5/5 Knee extension: 5/5   Knee flexion: 5/5 Knee flexion: 5/5   Hip flexion: 4+/5 * (very mild) Hip flexion: 4+/5   Hip extension:  4+/5 Hip extension: 4+/5   Hip abduction: 5/5 Hip abduction: 5/5   Hip adduction: 5/5 Hip adduction 5/5   Ankle dorsiflexion: 5/5 Ankle dorsiflexion: 5/5   Ankle plantarflexion: 5/5 Ankle plantarflexion: 5/5   Pain = *     Function:  - Sit <--> Stand: independent  - Bed Mobility: independent     Joint Mobility: WFL into all planes - Patellar, SI: no rotation noted upon exam     Palpation: Mild TTP along R piriformis     Sensation: intact B to light touch     Flexibility:               Hamstring Test: R = 70 degrees ; L = 70 degrees              Annika's test: R = - ; L = -     Edema: none present     Limitation/Restriction for FOTO Upper Leg Survey     Therapist reviewed FOTO scores for Benji Ring on 8/20/2020.   FOTO documents entered into [employer] - see Media section.     Limitation Score: 9%        Benji received therapeutic exercises to develop strength, endurance, ROM and flexibility for 40 minutes including:     Standing IT Band Stretch 2x20s B (NP today - HEP)  Supine Piriformis Stretch 3x20s B  Supine Hip Bridges 2x10 reps B + Hip abduction with Y sport loop (NP today - time)  DL > SL Hip Bridge 2x10 reps BLE  SLR 2x10 reps BLE #1 cuff (NP today - time)  SL Hip Abduction 2x10 reps B   Prone Hip Extensions + knee straight 2x10 reps  Prone Quad Stretch with strap 2x20s BLE  Push/pull (R hip ext/L hip flex) 3x3s (NP today)    Standing Hip Abduction 2x10 reps BLE Y sport loop  Standing Hip Extension 2x10 reps BLE Y sport loop  Shuttle Press 3x10 reps (3 black cords)  Shuttle Press Single Leg 2x10 reps (2 black cords)  Shuttle Press SL x15 reps BLE (1 black cord)  Standing Gastroc/Soleus Stretch 2x20s B (slant board level 3)  Side Steps in cross walk x1 lap (Y sport loop around  ankles)  Pilates Stepper 2x10 reps BLE (2 springs L3)  SLS Re-bounder Toss 2x10 reps (forward) + red med ball BLE + lateral       Not Performed Today:  SL Clamshells 2x10 reps (Y Sport Loop) (NP today - time)  Seated Hip Adduction 3x10 reps (3 sec holds) yellow ball (NP today - time)  Seated Hip IR with RTB 2x10 reps B (NP today - time)  Seated Hip ER with RTB 2x10 reps B (NP today - time)  Prone hip extension knee straight 2x10 B LE (NP today - time)  Supine Hip Flexion with YTB x10 reps BLE (NP today)      Benji received the following manual therapy techniques: Myofacial release and Soft tissue Mobilization were applied for 10 minutes, including: (NP today)    (NP) STM to the R IT Band/TFL  STM to R lumbar paraspinals/piriformis/QL  Cupping to the R IT Band (NP today)    Home Exercises Provided and Patient Education Provided     Education provided:   - HEP Review and Administration  - Post Exercise Soreness  - maintaining a pain free ROM as much as possible with activity    Written Home Exercises Provided: Patient instructed to cont prior HEP.  Exercises were reviewed and Navneetritorychristian was able to demonstrate them prior to the end of the session.  Navneetjulian demonstrated good  understanding of the education provided.     See EMR under Patient Instructions for exercises provided on 6/29/2020 (initial evaluation) and 8/20/2020.    Assessment     Benji presents with improved functional mobility overall and increased LE strength upon re-assessment. The patient does not demonstrate any provocation of pain during activity. The patient has met 7/8 goals and is comfortable returning to school and being able to perform all necessary tasks such as bending over and picking things up. An extensive HEP was given for continued improvements outside of the clinical setting. Noted hamstring flexibility to have improved since beginning treatment as well. At this point, the patient is discharged from skilled outpatient PT  services.    Discharge reason: Patient is now asymptomatic and Patient requested discharge    Benji is progressing well towards her goals.   Pt prognosis is Good.     Pt will continue to benefit from skilled outpatient physical therapy to address the deficits listed in the problem list box on initial evaluation, provide pt/family education and to maximize pt's level of independence in the home and community environment.     Pt's spiritual, cultural and educational needs considered and pt agreeable to plan of care and goals.    Anticipated barriers to physical therapy: None    Goals:   Short Term Goals: 4 weeks  - Pt will demonstrate improved R hip ROM, especially into hip ER, by at least 5 degrees for improved mobility overall. (Not Met)  - Pt will demonstrate improved LE strength, especially into hip abduction and extension, by at least 1/2 grade via MMT for improved tolerance to daily activities. (MET: 7/27/2020)  - Pt will demonstrate increased hamstring flexibility bilaterally, via hamstring test, by at least 5 degrees for improved tissue extensibility in the lower extremities. (MET: 7/27/2020)  - Pt will demonstrate independence with HEP for continued improvements outside of the clinical setting. (MET: 7/27/2020)     Long Term Goals: 8 weeks   - Pt will demonstrate a negative Annika's test for improved mobility and extensibility of the IT band. (MET: 8/20/2020)  - Pt will demonstrate improved LE strength, especially into hip abduction and extension, by at least 1 full grade via MMT for improved tolerance to daily activities. (MET: 8/20/2020)  - Pt will demonstrate increased hamstring flexibility bilaterally, via hamstring test, by at least 10 degrees for improved tissue extensibility in the lower extremities. (MET: 8/20/2020)  - Pt will be able to perform all functional movements required to return to work (bending over, crossing her legs, picking something up from the floor) with minimal to no provocation of  pain for return to PLOF. (MET: 8/20/2020)    Plan     This patient is discharged from skilled outpatient Physical Therapy services.    Daija Larsen, PT, DPT, Cert.DN

## 2020-10-05 ENCOUNTER — PATIENT MESSAGE (OUTPATIENT)
Dept: ADMINISTRATIVE | Facility: HOSPITAL | Age: 65
End: 2020-10-05

## 2020-10-30 ENCOUNTER — PATIENT MESSAGE (OUTPATIENT)
Dept: ADMINISTRATIVE | Facility: HOSPITAL | Age: 65
End: 2020-10-30

## 2020-11-03 ENCOUNTER — TELEPHONE (OUTPATIENT)
Dept: FAMILY MEDICINE | Facility: CLINIC | Age: 65
End: 2020-11-03

## 2020-11-03 DIAGNOSIS — Z00.00 PREVENTATIVE HEALTH CARE: Primary | ICD-10-CM

## 2020-11-03 NOTE — TELEPHONE ENCOUNTER
----- Message from Barbara Ellington sent at 11/3/2020 12:41 PM CST -----  Contact: call  pt [phone]   Type: Needs Medical Advice  Who Called: pt  Best Call Back Number: call  pt [phone]  Additional Information:  pt is  calling to  get a   script  for the  shingle  shot   to  bring  to [facility]   please  call  [facility]  at [phone]  to   get  fax#  per pt

## 2020-11-03 NOTE — TELEPHONE ENCOUNTER
Patient is requesting a paper prescription to have a shingles vaccine at [facility]. Please advise. Last office visit was on 06/18/2020 and instructed to follow up in 8 months on 02/18/2021.

## 2020-11-05 RX ORDER — ZOSTER VACCINE RECOMBINANT, ADJUVANTED 50 MCG/0.5
0.5 KIT INTRAMUSCULAR ONCE
Qty: 1 EACH | Refills: 1 | Status: SHIPPED | OUTPATIENT
Start: 2020-11-05 | End: 2020-11-05

## 2020-11-12 ENCOUNTER — TELEPHONE (OUTPATIENT)
Dept: FAMILY MEDICINE | Facility: CLINIC | Age: 65
End: 2020-11-12

## 2020-11-12 NOTE — TELEPHONE ENCOUNTER
Spoke with pt, she states she had a reaction to the shingrix vaccine. She had inj on Monday evening. By Wednesday she had swelling and redness and warmth about the size of a softball on her arm. It progressively worsened. She will not have second injection. Updated allergy list.

## 2020-11-12 NOTE — TELEPHONE ENCOUNTER
----- Message from Augusta Durham sent at 11/12/2020 11:28 AM CST -----  Regarding: advice  Contact: CATARINA CRAWFORD [[id number]]  Patient is requesting a call back from the nurse stated she had a reaction to the shingles vaccinations.    Please call the patient upon request at phone number [phone].

## 2020-11-25 ENCOUNTER — TELEPHONE (OUTPATIENT)
Dept: OPHTHALMOLOGY | Facility: CLINIC | Age: 65
End: 2020-11-25

## 2020-12-18 ENCOUNTER — PATIENT OUTREACH (OUTPATIENT)
Dept: ADMINISTRATIVE | Facility: OTHER | Age: 65
End: 2020-12-18

## 2020-12-18 NOTE — PROGRESS NOTES
LINKS immunization registry updated  Care Everywhere updated  Health Maintenance updated  Chart reviewed for overdue Proactive [facility] Encounters (NED[facility]) health maintenance testing (CRS, Breast Ca, Diabetic Eye Exam)   Orders entered:N/A

## 2020-12-24 ENCOUNTER — OFFICE VISIT (OUTPATIENT)
Dept: OPTOMETRY | Facility: CLINIC | Age: 65
End: 2020-12-24
Payer: COMMERCIAL

## 2020-12-24 DIAGNOSIS — H25.13 NUCLEAR SCLEROSIS OF BOTH EYES: ICD-10-CM

## 2020-12-24 DIAGNOSIS — Z46.0 FITTING AND ADJUSTMENT OF SPECTACLES AND CONTACT LENSES: Primary | ICD-10-CM

## 2020-12-24 DIAGNOSIS — E11.9 DIABETES MELLITUS TYPE 2 WITHOUT RETINOPATHY: Primary | ICD-10-CM

## 2020-12-24 DIAGNOSIS — H52.13 MYOPIA WITH ASTIGMATISM AND PRESBYOPIA, BILATERAL: ICD-10-CM

## 2020-12-24 DIAGNOSIS — H52.203 MYOPIA WITH ASTIGMATISM AND PRESBYOPIA, BILATERAL: ICD-10-CM

## 2020-12-24 DIAGNOSIS — H52.4 MYOPIA WITH ASTIGMATISM AND PRESBYOPIA, BILATERAL: ICD-10-CM

## 2020-12-24 PROCEDURE — 1126F PR PAIN SEVERITY QUANTIFIED, NO PAIN PRESENT: ICD-10-PCS | Mod: S$GLB,,, | Performed by: OPTOMETRIST

## 2020-12-24 PROCEDURE — 2023F DILAT RTA XM W/O RTNOPTHY: CPT | Mod: S$GLB,,, | Performed by: OPTOMETRIST

## 2020-12-24 PROCEDURE — 99999 PR PBB SHADOW E&M-EST. PATIENT-LVL III: CPT | Mod: PBBFAC,,, | Performed by: OPTOMETRIST

## 2020-12-24 PROCEDURE — 99999 PR PBB SHADOW E&M-EST. PATIENT-LVL II: CPT | Mod: PBBFAC,,, | Performed by: OPTOMETRIST

## 2020-12-24 PROCEDURE — 1101F PT FALLS ASSESS-DOCD LE1/YR: CPT | Mod: CPTII,S$GLB,, | Performed by: OPTOMETRIST

## 2020-12-24 PROCEDURE — 92014 COMPRE OPH EXAM EST PT 1/>: CPT | Mod: S$GLB,,, | Performed by: OPTOMETRIST

## 2020-12-24 PROCEDURE — 92310 CONTACT LENS FITTING OU: CPT | Mod: CSM,,, | Performed by: OPTOMETRIST

## 2020-12-24 PROCEDURE — 3288F FALL RISK ASSESSMENT DOCD: CPT | Mod: CPTII,S$GLB,, | Performed by: OPTOMETRIST

## 2020-12-24 PROCEDURE — 1126F AMNT PAIN NOTED NONE PRSNT: CPT | Mod: S$GLB,,, | Performed by: OPTOMETRIST

## 2020-12-24 PROCEDURE — 2023F PR DILATED RETINAL EXAM W/O EVID OF RETINOPATHY: ICD-10-PCS | Mod: S$GLB,,, | Performed by: OPTOMETRIST

## 2020-12-24 PROCEDURE — 1101F PR PT FALLS ASSESS DOC 0-1 FALLS W/OUT INJ PAST YR: ICD-10-PCS | Mod: CPTII,S$GLB,, | Performed by: OPTOMETRIST

## 2020-12-24 PROCEDURE — 99999 PR PBB SHADOW E&M-EST. PATIENT-LVL II: ICD-10-PCS | Mod: PBBFAC,,, | Performed by: OPTOMETRIST

## 2020-12-24 PROCEDURE — 99999 PR PBB SHADOW E&M-EST. PATIENT-LVL III: ICD-10-PCS | Mod: PBBFAC,,, | Performed by: OPTOMETRIST

## 2020-12-24 PROCEDURE — 92310 PR CONTACT LENS FITTING (NO CHANGE): ICD-10-PCS | Mod: CSM,,, | Performed by: OPTOMETRIST

## 2020-12-24 PROCEDURE — 92014 PR EYE EXAM, EST PATIENT,COMPREHESV: ICD-10-PCS | Mod: S$GLB,,, | Performed by: OPTOMETRIST

## 2020-12-24 PROCEDURE — 92015 PR REFRACTION: ICD-10-PCS | Mod: S$GLB,,, | Performed by: OPTOMETRIST

## 2020-12-24 PROCEDURE — 92015 DETERMINE REFRACTIVE STATE: CPT | Mod: S$GLB,,, | Performed by: OPTOMETRIST

## 2020-12-24 PROCEDURE — 3288F PR FALLS RISK ASSESSMENT DOCUMENTED: ICD-10-PCS | Mod: CPTII,S$GLB,, | Performed by: OPTOMETRIST

## 2020-12-24 NOTE — PROGRESS NOTES
HPI     Patient is here for annual eye exam with cl dls - 09/18/19  Patient states she likes her current cl sometimes vision will get a little   blurry. Diabetes and htn are stable on meds. When her blood sugar drops   her vision will get blurry gets a headache after her sugar levels out.   Occasional floaters no fol.         Hemoglobin A1C       Date                     Value               Ref Range             Status                05/18/2020               6.4 (A)             4.0 - 6.0 %           Final              Comment:    [facility] lab report       06/29/2015               5.9 (H)             0.0 - 5.6 %           Final              Comment:    Reference Interval:_5.0 - 5.6 Normal  5.7 - 6.4 High Risk  > 6.5   Diabetic  Hgb A1c results are standardized based on the (NGSP)   National  Glycohemoglobin Standardization Program.  Hemoglobin A1C levels   are related to mean serum/plasma glucose during the  preceding 2-3   months.    ----------    Last edited by Beth Steele MA on 12/24/2020 10:18 AM. (History)            Assessment /Plan     For exam results, see Encounter Report.    Diabetes mellitus type 2 without retinopathy    Nuclear sclerosis of both eyes    Myopia with astigmatism and presbyopia, bilateral      1. No diabetic retinopathy, no csme. Return in 1 year for dilated eye exam.  2. Educated pt on presence of cataracts and effects on vision. No surgery at this time. Recheck in one year.  3. New Spec Rx given. Different lens options discussed with patient. RTC 1 year full exam.  Addend 12/28/20 adjusted clrx os.

## 2020-12-24 NOTE — LETTER
December 24, 2020      MAC Lai, OD  [address] [facility] [address] [place] [address]           [facility]  [address] [place] [address]  Phone: [phone]  Fax: [phone]          Patient: Benji Ring   MR Number: [id number]   YOB: 1955   Date of Visit: 12/24/2020       Dear Dr. MAC Lai:    Thank you for referring Benji Ring to me for evaluation. Attached you will find relevant portions of my assessment and plan of care.    If you have questions, please do not hesitate to call me. I look forward to following Benji Ring along with you.    Sincerely,    Atilio Meehan, OD    Enclosure  CC:  No Recipients    If you would like to receive this communication electronically, please contact [email or web address] or [phone] to request more information on "[employer] Link access.    For providers and/or their staff who would like to refer a patient to [facility], please contact us through our one-stop-shop provider referral line, [facility], at [phone].    If you feel you have received this communication in error or would no longer like to receive these types of communications, please e-mail [email or web address]

## 2020-12-27 ENCOUNTER — PATIENT MESSAGE (OUTPATIENT)
Dept: OPTOMETRY | Facility: CLINIC | Age: 65
End: 2020-12-27

## 2020-12-28 ENCOUNTER — LAB VISIT (OUTPATIENT)
Dept: LAB | Facility: HOSPITAL | Age: 65
End: 2020-12-28
Attending: INTERNAL MEDICINE
Payer: COMMERCIAL

## 2020-12-28 ENCOUNTER — TELEPHONE (OUTPATIENT)
Dept: OPTOMETRY | Facility: CLINIC | Age: 65
End: 2020-12-28

## 2020-12-28 DIAGNOSIS — Z85.3 HISTORY OF BREAST CANCER: ICD-10-CM

## 2020-12-28 DIAGNOSIS — E11.9 TYPE 2 DIABETES MELLITUS WITHOUT COMPLICATION: ICD-10-CM

## 2020-12-28 LAB
ESTIMATED AVG GLUCOSE: 186 MG/DL (ref 68–131)
HBA1C BLD-MCNC: 8.1 % (ref 0–5.6)

## 2020-12-28 PROCEDURE — 36415 COLL VENOUS BLD VENIPUNCTURE: CPT | Mod: PN

## 2020-12-28 PROCEDURE — 83036 HEMOGLOBIN GLYCOSYLATED A1C: CPT | Mod: PN

## 2020-12-28 PROCEDURE — 83036 HEMOGLOBIN GLYCOSYLATED A1C: CPT

## 2020-12-29 ENCOUNTER — OFFICE VISIT (OUTPATIENT)
Dept: HEMATOLOGY/ONCOLOGY | Facility: CLINIC | Age: 65
End: 2020-12-29
Payer: COMMERCIAL

## 2020-12-29 ENCOUNTER — LAB VISIT (OUTPATIENT)
Dept: LAB | Facility: HOSPITAL | Age: 65
End: 2020-12-29
Attending: INTERNAL MEDICINE
Payer: COMMERCIAL

## 2020-12-29 VITALS
HEIGHT: 63 IN | WEIGHT: 131.81 LBS | DIASTOLIC BLOOD PRESSURE: 72 MMHG | RESPIRATION RATE: 18 BRPM | TEMPERATURE: 97 F | HEART RATE: 75 BPM | SYSTOLIC BLOOD PRESSURE: 128 MMHG | OXYGEN SATURATION: 99 % | BODY MASS INDEX: 23.36 KG/M2

## 2020-12-29 DIAGNOSIS — Z78.0 POSTMENOPAUSAL: ICD-10-CM

## 2020-12-29 DIAGNOSIS — E11.9 DIABETES MELLITUS TYPE 2 IN NONOBESE: ICD-10-CM

## 2020-12-29 DIAGNOSIS — Z15.09 BRCA2 GENE MUTATION POSITIVE: ICD-10-CM

## 2020-12-29 DIAGNOSIS — Z15.01 BRCA2 GENE MUTATION POSITIVE: ICD-10-CM

## 2020-12-29 DIAGNOSIS — Z85.3 HISTORY OF BREAST CANCER: ICD-10-CM

## 2020-12-29 DIAGNOSIS — Z85.3 HISTORY OF BREAST CANCER: Primary | ICD-10-CM

## 2020-12-29 LAB
BASOPHILS # BLD AUTO: 0.07 K/UL (ref 0–0.2)
BASOPHILS NFR BLD: 0.8 % (ref 0–1.9)
DIFFERENTIAL METHOD: NORMAL
EOSINOPHIL # BLD AUTO: 0.1 K/UL (ref 0–0.5)
EOSINOPHIL NFR BLD: 1.2 % (ref 0–8)
ERYTHROCYTE [DISTWIDTH] IN BLOOD BY AUTOMATED COUNT: 13 % (ref 11.5–14.5)
HCT VFR BLD AUTO: 45.5 % (ref 37–48.5)
HGB BLD-MCNC: 14.8 G/DL (ref 12–16)
IMM GRANULOCYTES # BLD AUTO: 0.03 K/UL (ref 0–0.04)
IMM GRANULOCYTES NFR BLD AUTO: 0.4 % (ref 0–0.5)
LYMPHOCYTES # BLD AUTO: 2.4 K/UL (ref 1–4.8)
LYMPHOCYTES NFR BLD: 28.4 % (ref 18–48)
MCH RBC QN AUTO: 30.8 PG (ref 27–31)
MCHC RBC AUTO-ENTMCNC: 32.5 G/DL (ref 32–36)
MCV RBC AUTO: 95 FL (ref 82–98)
MONOCYTES # BLD AUTO: 0.7 K/UL (ref 0.3–1)
MONOCYTES NFR BLD: 7.8 % (ref 4–15)
NEUTROPHILS # BLD AUTO: 5.2 K/UL (ref 1.8–7.7)
NEUTROPHILS NFR BLD: 61.4 % (ref 38–73)
NRBC BLD-RTO: 0 /100 WBC
PLATELET # BLD AUTO: 256 K/UL (ref 150–350)
PMV BLD AUTO: 9.9 FL (ref 9.2–12.9)
RBC # BLD AUTO: 4.81 M/UL (ref 4–5.4)
WBC # BLD AUTO: 8.49 K/UL (ref 3.9–12.7)

## 2020-12-29 PROCEDURE — 3008F PR BODY MASS INDEX (BMI) DOCUMENTED: ICD-10-PCS | Mod: CPTII,S$GLB,, | Performed by: INTERNAL MEDICINE

## 2020-12-29 PROCEDURE — 36415 COLL VENOUS BLD VENIPUNCTURE: CPT | Mod: PN

## 2020-12-29 PROCEDURE — 85025 COMPLETE CBC W/AUTO DIFF WBC: CPT

## 2020-12-29 PROCEDURE — 3288F FALL RISK ASSESSMENT DOCD: CPT | Mod: CPTII,S$GLB,, | Performed by: INTERNAL MEDICINE

## 2020-12-29 PROCEDURE — 1125F AMNT PAIN NOTED PAIN PRSNT: CPT | Mod: S$GLB,,, | Performed by: INTERNAL MEDICINE

## 2020-12-29 PROCEDURE — 99999 PR PBB SHADOW E&M-EST. PATIENT-LVL V: CPT | Mod: PBBFAC,,, | Performed by: INTERNAL MEDICINE

## 2020-12-29 PROCEDURE — 1101F PT FALLS ASSESS-DOCD LE1/YR: CPT | Mod: CPTII,S$GLB,, | Performed by: INTERNAL MEDICINE

## 2020-12-29 PROCEDURE — 3078F PR MOST RECENT DIASTOLIC BLOOD PRESSURE < 80 MM HG: ICD-10-PCS | Mod: CPTII,S$GLB,, | Performed by: INTERNAL MEDICINE

## 2020-12-29 PROCEDURE — 99214 OFFICE O/P EST MOD 30 MIN: CPT | Mod: S$GLB,,, | Performed by: INTERNAL MEDICINE

## 2020-12-29 PROCEDURE — 3288F PR FALLS RISK ASSESSMENT DOCUMENTED: ICD-10-PCS | Mod: CPTII,S$GLB,, | Performed by: INTERNAL MEDICINE

## 2020-12-29 PROCEDURE — 85025 COMPLETE CBC W/AUTO DIFF WBC: CPT | Mod: PN

## 2020-12-29 PROCEDURE — 3052F PR MOST RECENT HEMOGLOBIN A1C LEVEL 8.0 - < 9.0%: ICD-10-PCS | Mod: CPTII,S$GLB,, | Performed by: INTERNAL MEDICINE

## 2020-12-29 PROCEDURE — 3074F SYST BP LT 130 MM HG: CPT | Mod: CPTII,S$GLB,, | Performed by: INTERNAL MEDICINE

## 2020-12-29 PROCEDURE — 1101F PR PT FALLS ASSESS DOC 0-1 FALLS W/OUT INJ PAST YR: ICD-10-PCS | Mod: CPTII,S$GLB,, | Performed by: INTERNAL MEDICINE

## 2020-12-29 PROCEDURE — 3072F LOW RISK FOR RETINOPATHY: CPT | Mod: S$GLB,,, | Performed by: INTERNAL MEDICINE

## 2020-12-29 PROCEDURE — 3008F BODY MASS INDEX DOCD: CPT | Mod: CPTII,S$GLB,, | Performed by: INTERNAL MEDICINE

## 2020-12-29 PROCEDURE — 99214 PR OFFICE/OUTPT VISIT, EST, LEVL IV, 30-39 MIN: ICD-10-PCS | Mod: S$GLB,,, | Performed by: INTERNAL MEDICINE

## 2020-12-29 PROCEDURE — 3078F DIAST BP <80 MM HG: CPT | Mod: CPTII,S$GLB,, | Performed by: INTERNAL MEDICINE

## 2020-12-29 PROCEDURE — 3052F HG A1C>EQUAL 8.0%<EQUAL 9.0%: CPT | Mod: CPTII,S$GLB,, | Performed by: INTERNAL MEDICINE

## 2020-12-29 PROCEDURE — 3072F PR LOW RISK FOR RETINOPATHY: ICD-10-PCS | Mod: S$GLB,,, | Performed by: INTERNAL MEDICINE

## 2020-12-29 PROCEDURE — 99999 PR PBB SHADOW E&M-EST. PATIENT-LVL V: ICD-10-PCS | Mod: PBBFAC,,, | Performed by: INTERNAL MEDICINE

## 2020-12-29 PROCEDURE — 3074F PR MOST RECENT SYSTOLIC BLOOD PRESSURE < 130 MM HG: ICD-10-PCS | Mod: CPTII,S$GLB,, | Performed by: INTERNAL MEDICINE

## 2020-12-29 PROCEDURE — 1125F PR PAIN SEVERITY QUANTIFIED, PAIN PRESENT: ICD-10-PCS | Mod: S$GLB,,, | Performed by: INTERNAL MEDICINE

## 2021-01-04 ENCOUNTER — PATIENT MESSAGE (OUTPATIENT)
Dept: ADMINISTRATIVE | Facility: HOSPITAL | Age: 66
End: 2021-01-04

## 2021-01-08 ENCOUNTER — TELEPHONE (OUTPATIENT)
Dept: HEMATOLOGY/ONCOLOGY | Facility: CLINIC | Age: 66
End: 2021-01-08

## 2021-01-08 ENCOUNTER — PATIENT MESSAGE (OUTPATIENT)
Dept: HEMATOLOGY/ONCOLOGY | Facility: CLINIC | Age: 66
End: 2021-01-08

## 2021-01-08 DIAGNOSIS — Z85.3 HISTORY OF BREAST CANCER: Primary | ICD-10-CM

## 2021-01-08 DIAGNOSIS — Z15.01 GENETIC SUSCEPTIBILITY TO MALIGNANT NEOPLASM OF BREAST: ICD-10-CM

## 2021-02-08 DIAGNOSIS — I10 ESSENTIAL HYPERTENSION: ICD-10-CM

## 2021-02-09 RX ORDER — LOSARTAN POTASSIUM 50 MG/1
TABLET ORAL
Qty: 90 TABLET | Refills: 0 | Status: SHIPPED | OUTPATIENT
Start: 2021-02-09 | End: 2021-03-04 | Stop reason: SDUPTHER

## 2021-03-04 ENCOUNTER — OFFICE VISIT (OUTPATIENT)
Dept: FAMILY MEDICINE | Facility: CLINIC | Age: 66
End: 2021-03-04
Payer: COMMERCIAL

## 2021-03-04 VITALS
TEMPERATURE: 98 F | HEIGHT: 63 IN | DIASTOLIC BLOOD PRESSURE: 68 MMHG | WEIGHT: 132.25 LBS | BODY MASS INDEX: 23.43 KG/M2 | SYSTOLIC BLOOD PRESSURE: 124 MMHG | RESPIRATION RATE: 17 BRPM | HEART RATE: 70 BPM

## 2021-03-04 DIAGNOSIS — Z00.00 PREVENTATIVE HEALTH CARE: ICD-10-CM

## 2021-03-04 DIAGNOSIS — I10 ESSENTIAL HYPERTENSION: Primary | ICD-10-CM

## 2021-03-04 DIAGNOSIS — E11.9 DIABETES MELLITUS TYPE 2 IN NONOBESE: ICD-10-CM

## 2021-03-04 DIAGNOSIS — B37.2 CANDIDAL INTERTRIGO: ICD-10-CM

## 2021-03-04 PROCEDURE — 99999 PR PBB SHADOW E&M-EST. PATIENT-LVL IV: ICD-10-PCS | Mod: PBBFAC,,, | Performed by: INTERNAL MEDICINE

## 2021-03-04 PROCEDURE — 99999 PR PBB SHADOW E&M-EST. PATIENT-LVL IV: CPT | Mod: PBBFAC,,, | Performed by: INTERNAL MEDICINE

## 2021-03-04 PROCEDURE — 3078F PR MOST RECENT DIASTOLIC BLOOD PRESSURE < 80 MM HG: ICD-10-PCS | Mod: CPTII,S$GLB,, | Performed by: INTERNAL MEDICINE

## 2021-03-04 PROCEDURE — 99214 OFFICE O/P EST MOD 30 MIN: CPT | Mod: S$GLB,,, | Performed by: INTERNAL MEDICINE

## 2021-03-04 PROCEDURE — 3074F SYST BP LT 130 MM HG: CPT | Mod: CPTII,S$GLB,, | Performed by: INTERNAL MEDICINE

## 2021-03-04 PROCEDURE — 3052F PR MOST RECENT HEMOGLOBIN A1C LEVEL 8.0 - < 9.0%: ICD-10-PCS | Mod: CPTII,S$GLB,, | Performed by: INTERNAL MEDICINE

## 2021-03-04 PROCEDURE — 1101F PT FALLS ASSESS-DOCD LE1/YR: CPT | Mod: CPTII,S$GLB,, | Performed by: INTERNAL MEDICINE

## 2021-03-04 PROCEDURE — 3072F LOW RISK FOR RETINOPATHY: CPT | Mod: S$GLB,,, | Performed by: INTERNAL MEDICINE

## 2021-03-04 PROCEDURE — 3008F PR BODY MASS INDEX (BMI) DOCUMENTED: ICD-10-PCS | Mod: CPTII,S$GLB,, | Performed by: INTERNAL MEDICINE

## 2021-03-04 PROCEDURE — 99214 PR OFFICE/OUTPT VISIT, EST, LEVL IV, 30-39 MIN: ICD-10-PCS | Mod: S$GLB,,, | Performed by: INTERNAL MEDICINE

## 2021-03-04 PROCEDURE — 3078F DIAST BP <80 MM HG: CPT | Mod: CPTII,S$GLB,, | Performed by: INTERNAL MEDICINE

## 2021-03-04 PROCEDURE — 3074F PR MOST RECENT SYSTOLIC BLOOD PRESSURE < 130 MM HG: ICD-10-PCS | Mod: CPTII,S$GLB,, | Performed by: INTERNAL MEDICINE

## 2021-03-04 PROCEDURE — 3008F BODY MASS INDEX DOCD: CPT | Mod: CPTII,S$GLB,, | Performed by: INTERNAL MEDICINE

## 2021-03-04 PROCEDURE — 1126F PR PAIN SEVERITY QUANTIFIED, NO PAIN PRESENT: ICD-10-PCS | Mod: S$GLB,,, | Performed by: INTERNAL MEDICINE

## 2021-03-04 PROCEDURE — 1101F PR PT FALLS ASSESS DOC 0-1 FALLS W/OUT INJ PAST YR: ICD-10-PCS | Mod: CPTII,S$GLB,, | Performed by: INTERNAL MEDICINE

## 2021-03-04 PROCEDURE — 3288F FALL RISK ASSESSMENT DOCD: CPT | Mod: CPTII,S$GLB,, | Performed by: INTERNAL MEDICINE

## 2021-03-04 PROCEDURE — 1126F AMNT PAIN NOTED NONE PRSNT: CPT | Mod: S$GLB,,, | Performed by: INTERNAL MEDICINE

## 2021-03-04 PROCEDURE — 3052F HG A1C>EQUAL 8.0%<EQUAL 9.0%: CPT | Mod: CPTII,S$GLB,, | Performed by: INTERNAL MEDICINE

## 2021-03-04 PROCEDURE — 3288F PR FALLS RISK ASSESSMENT DOCUMENTED: ICD-10-PCS | Mod: CPTII,S$GLB,, | Performed by: INTERNAL MEDICINE

## 2021-03-04 PROCEDURE — 3072F PR LOW RISK FOR RETINOPATHY: ICD-10-PCS | Mod: S$GLB,,, | Performed by: INTERNAL MEDICINE

## 2021-03-04 RX ORDER — LOSARTAN POTASSIUM 50 MG/1
50 TABLET ORAL DAILY
Qty: 90 TABLET | Refills: 1 | Status: SHIPPED | OUTPATIENT
Start: 2021-03-04 | End: 2021-11-12

## 2021-03-04 RX ORDER — LANCETS 33 GAUGE
EACH MISCELLANEOUS
COMMUNITY
Start: 2021-02-18

## 2021-03-04 RX ORDER — ATENOLOL 50 MG/1
50 TABLET ORAL DAILY
Qty: 90 TABLET | Refills: 1 | Status: SHIPPED | OUTPATIENT
Start: 2021-03-04 | End: 2021-09-09

## 2021-03-04 RX ORDER — BLOOD-GLUCOSE METER
1 EACH MISCELLANEOUS 3 TIMES DAILY
COMMUNITY
Start: 2021-02-17

## 2021-03-04 RX ORDER — KETOCONAZOLE 20 MG/G
CREAM TOPICAL DAILY
Qty: 30 G | Refills: 0 | Status: SHIPPED | OUTPATIENT
Start: 2021-03-04 | End: 2022-06-30

## 2021-03-04 RX ORDER — NYSTATIN 100000 [USP'U]/G
POWDER TOPICAL 3 TIMES DAILY
Qty: 60 G | Refills: 1 | Status: SHIPPED | OUTPATIENT
Start: 2021-03-04 | End: 2022-06-30

## 2021-03-05 PROBLEM — K86.89 PANCREATIC MASS: Status: ACTIVE | Noted: 2021-03-05

## 2021-04-05 ENCOUNTER — TELEPHONE (OUTPATIENT)
Dept: FAMILY MEDICINE | Facility: CLINIC | Age: 66
End: 2021-04-05

## 2021-04-05 LAB
HCV AB S/CO SERPL IA: 0.02
HCV AB SERPL QL IA: NORMAL
HIV 1+2 AB+HIV1 P24 AG SERPL QL IA: NORMAL

## 2021-04-06 ENCOUNTER — PATIENT MESSAGE (OUTPATIENT)
Dept: ADMINISTRATIVE | Facility: HOSPITAL | Age: 66
End: 2021-04-06

## 2021-04-06 ENCOUNTER — PATIENT MESSAGE (OUTPATIENT)
Dept: FAMILY MEDICINE | Facility: CLINIC | Age: 66
End: 2021-04-06

## 2021-04-19 ENCOUNTER — PATIENT OUTREACH (OUTPATIENT)
Dept: ADMINISTRATIVE | Facility: HOSPITAL | Age: 66
End: 2021-04-19

## 2021-05-10 ENCOUNTER — PATIENT MESSAGE (OUTPATIENT)
Dept: FAMILY MEDICINE | Facility: CLINIC | Age: 66
End: 2021-05-10

## 2021-05-13 DIAGNOSIS — E11.9 TYPE 2 DIABETES MELLITUS WITHOUT COMPLICATION: ICD-10-CM

## 2021-06-02 ENCOUNTER — TELEPHONE (OUTPATIENT)
Dept: FAMILY MEDICINE | Facility: CLINIC | Age: 66
End: 2021-06-02

## 2021-07-07 ENCOUNTER — PATIENT MESSAGE (OUTPATIENT)
Dept: ADMINISTRATIVE | Facility: HOSPITAL | Age: 66
End: 2021-07-07

## 2021-07-08 DIAGNOSIS — E11.9 TYPE 2 DIABETES MELLITUS WITHOUT COMPLICATION: ICD-10-CM

## 2021-08-04 ENCOUNTER — PATIENT MESSAGE (OUTPATIENT)
Dept: ADMINISTRATIVE | Facility: HOSPITAL | Age: 66
End: 2021-08-04

## 2021-09-07 ENCOUNTER — TELEPHONE (OUTPATIENT)
Dept: FAMILY MEDICINE | Facility: CLINIC | Age: 66
End: 2021-09-07

## 2021-09-08 ENCOUNTER — TELEPHONE (OUTPATIENT)
Dept: FAMILY MEDICINE | Facility: CLINIC | Age: 66
End: 2021-09-08

## 2021-11-03 ENCOUNTER — PATIENT MESSAGE (OUTPATIENT)
Dept: ADMINISTRATIVE | Facility: HOSPITAL | Age: 66
End: 2021-11-03
Payer: COMMERCIAL

## 2021-12-09 ENCOUNTER — TELEPHONE (OUTPATIENT)
Dept: INFUSION THERAPY | Facility: HOSPITAL | Age: 66
End: 2021-12-09
Payer: COMMERCIAL

## 2021-12-22 ENCOUNTER — PATIENT OUTREACH (OUTPATIENT)
Dept: ADMINISTRATIVE | Facility: OTHER | Age: 66
End: 2021-12-22
Payer: COMMERCIAL

## 2021-12-28 ENCOUNTER — OFFICE VISIT (OUTPATIENT)
Dept: OPTOMETRY | Facility: CLINIC | Age: 66
End: 2021-12-28
Payer: COMMERCIAL

## 2021-12-28 DIAGNOSIS — Z46.0 CONTACT LENS/GLASSES FITTING: Primary | ICD-10-CM

## 2021-12-28 DIAGNOSIS — E11.36 CATARACT ASSOCIATED WITH TYPE 2 DIABETES MELLITUS: ICD-10-CM

## 2021-12-28 DIAGNOSIS — H52.203 MYOPIA WITH ASTIGMATISM AND PRESBYOPIA, BILATERAL: ICD-10-CM

## 2021-12-28 DIAGNOSIS — H52.4 MYOPIA WITH ASTIGMATISM AND PRESBYOPIA, BILATERAL: ICD-10-CM

## 2021-12-28 DIAGNOSIS — E11.9 DIABETES MELLITUS TYPE 2 WITHOUT RETINOPATHY: Primary | ICD-10-CM

## 2021-12-28 DIAGNOSIS — H52.13 MYOPIA WITH ASTIGMATISM AND PRESBYOPIA, BILATERAL: ICD-10-CM

## 2021-12-28 DIAGNOSIS — Z13.5 GLAUCOMA SCREENING: ICD-10-CM

## 2021-12-28 DIAGNOSIS — Z46.0 CONTACT LENS/GLASSES FITTING: ICD-10-CM

## 2021-12-28 DIAGNOSIS — H43.393 VITREOUS FLOATERS, BILATERAL: ICD-10-CM

## 2021-12-28 PROBLEM — Z97.3 WEARS CONTACT LENSES: Status: ACTIVE | Noted: 2021-12-28

## 2021-12-28 PROCEDURE — 1160F PR REVIEW ALL MEDS BY PRESCRIBER/CLIN PHARMACIST DOCUMENTED: ICD-10-PCS | Mod: CPTII,S$GLB,, | Performed by: OPTOMETRIST

## 2021-12-28 PROCEDURE — 92310 CONTACT LENS FITTING OU: CPT | Mod: CSM,,, | Performed by: OPTOMETRIST

## 2021-12-28 PROCEDURE — 99499 NO LOS: ICD-10-PCS | Mod: S$GLB,,, | Performed by: OPTOMETRIST

## 2021-12-28 PROCEDURE — 1126F AMNT PAIN NOTED NONE PRSNT: CPT | Mod: CPTII,S$GLB,, | Performed by: OPTOMETRIST

## 2021-12-28 PROCEDURE — 1159F MED LIST DOCD IN RCRD: CPT | Mod: CPTII,S$GLB,, | Performed by: OPTOMETRIST

## 2021-12-28 PROCEDURE — 1160F RVW MEDS BY RX/DR IN RCRD: CPT | Mod: CPTII,S$GLB,, | Performed by: OPTOMETRIST

## 2021-12-28 PROCEDURE — 92014 COMPRE OPH EXAM EST PT 1/>: CPT | Mod: S$GLB,,, | Performed by: OPTOMETRIST

## 2021-12-28 PROCEDURE — 99499 UNLISTED E&M SERVICE: CPT | Mod: S$GLB,,, | Performed by: OPTOMETRIST

## 2021-12-28 PROCEDURE — 3288F FALL RISK ASSESSMENT DOCD: CPT | Mod: CPTII,S$GLB,, | Performed by: OPTOMETRIST

## 2021-12-28 PROCEDURE — 2023F PR DILATED RETINAL EXAM W/O EVID OF RETINOPATHY: ICD-10-PCS | Mod: CPTII,S$GLB,, | Performed by: OPTOMETRIST

## 2021-12-28 PROCEDURE — 92310 PR CONTACT LENS FITTING (NO CHANGE): ICD-10-PCS | Mod: CSM,,, | Performed by: OPTOMETRIST

## 2021-12-28 PROCEDURE — 3288F PR FALLS RISK ASSESSMENT DOCUMENTED: ICD-10-PCS | Mod: CPTII,S$GLB,, | Performed by: OPTOMETRIST

## 2021-12-28 PROCEDURE — 4010F ACE/ARB THERAPY RXD/TAKEN: CPT | Mod: CPTII,S$GLB,, | Performed by: OPTOMETRIST

## 2021-12-28 PROCEDURE — 1126F PR PAIN SEVERITY QUANTIFIED, NO PAIN PRESENT: ICD-10-PCS | Mod: CPTII,S$GLB,, | Performed by: OPTOMETRIST

## 2021-12-28 PROCEDURE — 1101F PR PT FALLS ASSESS DOC 0-1 FALLS W/OUT INJ PAST YR: ICD-10-PCS | Mod: CPTII,S$GLB,, | Performed by: OPTOMETRIST

## 2021-12-28 PROCEDURE — 92014 PR EYE EXAM, EST PATIENT,COMPREHESV: ICD-10-PCS | Mod: S$GLB,,, | Performed by: OPTOMETRIST

## 2021-12-28 PROCEDURE — 4010F PR ACE/ARB THEARPY RXD/TAKEN: ICD-10-PCS | Mod: CPTII,S$GLB,, | Performed by: OPTOMETRIST

## 2021-12-28 PROCEDURE — 1101F PT FALLS ASSESS-DOCD LE1/YR: CPT | Mod: CPTII,S$GLB,, | Performed by: OPTOMETRIST

## 2021-12-28 PROCEDURE — 2023F DILAT RTA XM W/O RTNOPTHY: CPT | Mod: CPTII,S$GLB,, | Performed by: OPTOMETRIST

## 2021-12-28 PROCEDURE — 1159F PR MEDICATION LIST DOCUMENTED IN MEDICAL RECORD: ICD-10-PCS | Mod: CPTII,S$GLB,, | Performed by: OPTOMETRIST

## 2021-12-28 PROCEDURE — 99999 PR PBB SHADOW E&M-EST. PATIENT-LVL IV: ICD-10-PCS | Mod: PBBFAC,,, | Performed by: OPTOMETRIST

## 2021-12-28 PROCEDURE — 99999 PR PBB SHADOW E&M-EST. PATIENT-LVL IV: CPT | Mod: PBBFAC,,, | Performed by: OPTOMETRIST

## 2021-12-28 RX ORDER — SEMAGLUTIDE 1.34 MG/ML
INJECTION, SOLUTION SUBCUTANEOUS
COMMUNITY
Start: 2021-11-13

## 2022-01-25 LAB — HBA1C MFR BLD: 8 % (ref 4–6)

## 2022-02-17 ENCOUNTER — PATIENT MESSAGE (OUTPATIENT)
Dept: ADMINISTRATIVE | Facility: HOSPITAL | Age: 67
End: 2022-02-17
Payer: COMMERCIAL

## 2022-02-17 ENCOUNTER — PATIENT OUTREACH (OUTPATIENT)
Dept: ADMINISTRATIVE | Facility: HOSPITAL | Age: 67
End: 2022-02-17
Payer: COMMERCIAL

## 2022-02-17 NOTE — PROGRESS NOTES
Non-compliant report chart audits [facility]BA1C.        Care Everywhere and media, updates requested and reviewed.      [facility] and [facility] reviewed for tests needed.    Outreach to patient    Portal message sent    External A1C records updated    Outreach:  [facility]1C

## 2022-03-10 ENCOUNTER — PATIENT MESSAGE (OUTPATIENT)
Dept: FAMILY MEDICINE | Facility: CLINIC | Age: 67
End: 2022-03-10
Payer: COMMERCIAL

## 2022-03-23 ENCOUNTER — TELEPHONE (OUTPATIENT)
Dept: UROLOGY | Facility: CLINIC | Age: 67
End: 2022-03-23
Payer: COMMERCIAL

## 2022-03-23 NOTE — TELEPHONE ENCOUNTER
----- Message from Miranda Castillo sent at 3/23/2022 12:22 PM CDT -----  Contact: pt  Type: Needs Medical Advice  Who Called:  pt   Best Call Back Number: [phone]    Additional Information: pt has appt on 4/11, need to r/s. Please call

## 2022-04-04 ENCOUNTER — PATIENT OUTREACH (OUTPATIENT)
Dept: ADMINISTRATIVE | Facility: OTHER | Age: 67
End: 2022-04-04
Payer: COMMERCIAL

## 2022-04-04 NOTE — PROGRESS NOTES
Chart was reviewed for overdue [facility]counters ([facility])  topics  Updates were requested from care everywhere  Health Maintenance has been updated  LINKS immunization registry triggered

## 2022-04-13 ENCOUNTER — OFFICE VISIT (OUTPATIENT)
Dept: UROLOGY | Facility: CLINIC | Age: 67
End: 2022-04-13
Payer: COMMERCIAL

## 2022-04-13 ENCOUNTER — HOSPITAL ENCOUNTER (OUTPATIENT)
Dept: RADIOLOGY | Facility: HOSPITAL | Age: 67
Discharge: HOME | End: 2022-04-13
Attending: NURSE PRACTITIONER
Payer: COMMERCIAL

## 2022-04-13 VITALS
BODY MASS INDEX: 22.61 KG/M2 | DIASTOLIC BLOOD PRESSURE: 83 MMHG | HEIGHT: 63 IN | RESPIRATION RATE: 18 BRPM | SYSTOLIC BLOOD PRESSURE: 143 MMHG | WEIGHT: 127.63 LBS | HEART RATE: 82 BPM

## 2022-04-13 DIAGNOSIS — R39.9 UTI SYMPTOMS: ICD-10-CM

## 2022-04-13 DIAGNOSIS — N39.0 URINARY TRACT INFECTION WITH HEMATURIA, SITE UNSPECIFIED: ICD-10-CM

## 2022-04-13 DIAGNOSIS — N39.0 RECURRENT UTI: Primary | ICD-10-CM

## 2022-04-13 DIAGNOSIS — Z87.442 PERSONAL HISTORY OF KIDNEY STONES: ICD-10-CM

## 2022-04-13 DIAGNOSIS — R31.9 URINARY TRACT INFECTION WITH HEMATURIA, SITE UNSPECIFIED: ICD-10-CM

## 2022-04-13 LAB
BACTERIA #/AREA URNS HPF: ABNORMAL /HPF
BILIRUB SERPL-MCNC: ABNORMAL MG/DL
BLOOD URINE, POC: ABNORMAL
CLARITY, POC UA: CLEAR
COLOR, POC UA: YELLOW
GLUCOSE UR QL STRIP: 500
KETONES UR QL STRIP: ABNORMAL
LEUKOCYTE ESTERASE URINE, POC: ABNORMAL
MICROSCOPIC COMMENT: ABNORMAL
NITRITE, POC UA: ABNORMAL
PH, POC UA: 7
PROTEIN, POC: 30
RBC #/AREA URNS HPF: 35 /HPF (ref 0–4)
SPECIFIC GRAVITY, POC UA: 1.02
UROBILINOGEN, POC UA: 0.2

## 2022-04-13 PROCEDURE — 51701 INSERT,NON-INDWELLING BLADDER CATHETER: ICD-10-PCS | Mod: S$GLB,,, | Performed by: NURSE PRACTITIONER

## 2022-04-13 PROCEDURE — 1101F PT FALLS ASSESS-DOCD LE1/YR: CPT | Mod: CPTII,S$GLB,, | Performed by: NURSE PRACTITIONER

## 2022-04-13 PROCEDURE — 99204 OFFICE O/P NEW MOD 45 MIN: CPT | Mod: 25,S$GLB,, | Performed by: NURSE PRACTITIONER

## 2022-04-13 PROCEDURE — 3288F FALL RISK ASSESSMENT DOCD: CPT | Mod: CPTII,S$GLB,, | Performed by: NURSE PRACTITIONER

## 2022-04-13 PROCEDURE — 3008F BODY MASS INDEX DOCD: CPT | Mod: CPTII,S$GLB,, | Performed by: NURSE PRACTITIONER

## 2022-04-13 PROCEDURE — 1159F PR MEDICATION LIST DOCUMENTED IN MEDICAL RECORD: ICD-10-PCS | Mod: CPTII,S$GLB,, | Performed by: NURSE PRACTITIONER

## 2022-04-13 PROCEDURE — 74178 CT UROGRAM ABD PELVIS W WO: ICD-10-PCS | Mod: 26,,, | Performed by: RADIOLOGY

## 2022-04-13 PROCEDURE — 99204 PR OFFICE/OUTPT VISIT, NEW, LEVL IV, 45-59 MIN: ICD-10-PCS | Mod: 25,S$GLB,, | Performed by: NURSE PRACTITIONER

## 2022-04-13 PROCEDURE — 87086 URINE CULTURE/COLONY COUNT: CPT | Performed by: NURSE PRACTITIONER

## 2022-04-13 PROCEDURE — 1101F PR PT FALLS ASSESS DOC 0-1 FALLS W/OUT INJ PAST YR: ICD-10-PCS | Mod: CPTII,S$GLB,, | Performed by: NURSE PRACTITIONER

## 2022-04-13 PROCEDURE — 1126F AMNT PAIN NOTED NONE PRSNT: CPT | Mod: CPTII,S$GLB,, | Performed by: NURSE PRACTITIONER

## 2022-04-13 PROCEDURE — 87077 CULTURE AEROBIC IDENTIFY: CPT | Performed by: NURSE PRACTITIONER

## 2022-04-13 PROCEDURE — 3052F PR MOST RECENT HEMOGLOBIN A1C LEVEL 8.0 - < 9.0%: ICD-10-PCS | Mod: CPTII,S$GLB,, | Performed by: NURSE PRACTITIONER

## 2022-04-13 PROCEDURE — 3008F PR BODY MASS INDEX (BMI) DOCUMENTED: ICD-10-PCS | Mod: CPTII,S$GLB,, | Performed by: NURSE PRACTITIONER

## 2022-04-13 PROCEDURE — 25500020 PHARM REV CODE 255

## 2022-04-13 PROCEDURE — 3072F PR LOW RISK FOR RETINOPATHY: ICD-10-PCS | Mod: CPTII,S$GLB,, | Performed by: NURSE PRACTITIONER

## 2022-04-13 PROCEDURE — 81002 POCT URINE DIPSTICK WITHOUT MICROSCOPE: ICD-10-PCS | Mod: S$GLB,,, | Performed by: NURSE PRACTITIONER

## 2022-04-13 PROCEDURE — 3052F HG A1C>EQUAL 8.0%<EQUAL 9.0%: CPT | Mod: CPTII,S$GLB,, | Performed by: NURSE PRACTITIONER

## 2022-04-13 PROCEDURE — 87088 URINE BACTERIA CULTURE: CPT | Performed by: NURSE PRACTITIONER

## 2022-04-13 PROCEDURE — 4010F PR ACE/ARB THEARPY RXD/TAKEN: ICD-10-PCS | Mod: CPTII,S$GLB,, | Performed by: NURSE PRACTITIONER

## 2022-04-13 PROCEDURE — 1126F PR PAIN SEVERITY QUANTIFIED, NO PAIN PRESENT: ICD-10-PCS | Mod: CPTII,S$GLB,, | Performed by: NURSE PRACTITIONER

## 2022-04-13 PROCEDURE — 81002 URINALYSIS NONAUTO W/O SCOPE: CPT | Mod: S$GLB,,, | Performed by: NURSE PRACTITIONER

## 2022-04-13 PROCEDURE — 81000 URINALYSIS NONAUTO W/SCOPE: CPT | Performed by: NURSE PRACTITIONER

## 2022-04-13 PROCEDURE — 74178 CT ABD&PLV WO CNTR FLWD CNTR: CPT | Mod: 26,,, | Performed by: RADIOLOGY

## 2022-04-13 PROCEDURE — 87186 SC STD MICRODIL/AGAR DIL: CPT | Performed by: NURSE PRACTITIONER

## 2022-04-13 PROCEDURE — 99999 PR PBB SHADOW E&M-EST. PATIENT-LVL V: ICD-10-PCS | Mod: PBBFAC,,, | Performed by: NURSE PRACTITIONER

## 2022-04-13 PROCEDURE — 99999 PR PBB SHADOW E&M-EST. PATIENT-LVL V: CPT | Mod: PBBFAC,,, | Performed by: NURSE PRACTITIONER

## 2022-04-13 PROCEDURE — 3079F PR MOST RECENT DIASTOLIC BLOOD PRESSURE 80-89 MM HG: ICD-10-PCS | Mod: CPTII,S$GLB,, | Performed by: NURSE PRACTITIONER

## 2022-04-13 PROCEDURE — 1160F RVW MEDS BY RX/DR IN RCRD: CPT | Mod: CPTII,S$GLB,, | Performed by: NURSE PRACTITIONER

## 2022-04-13 PROCEDURE — 3072F LOW RISK FOR RETINOPATHY: CPT | Mod: CPTII,S$GLB,, | Performed by: NURSE PRACTITIONER

## 2022-04-13 PROCEDURE — 3077F SYST BP >= 140 MM HG: CPT | Mod: CPTII,S$GLB,, | Performed by: NURSE PRACTITIONER

## 2022-04-13 PROCEDURE — 3077F PR MOST RECENT SYSTOLIC BLOOD PRESSURE >= 140 MM HG: ICD-10-PCS | Mod: CPTII,S$GLB,, | Performed by: NURSE PRACTITIONER

## 2022-04-13 PROCEDURE — 4010F ACE/ARB THERAPY RXD/TAKEN: CPT | Mod: CPTII,S$GLB,, | Performed by: NURSE PRACTITIONER

## 2022-04-13 PROCEDURE — 3079F DIAST BP 80-89 MM HG: CPT | Mod: CPTII,S$GLB,, | Performed by: NURSE PRACTITIONER

## 2022-04-13 PROCEDURE — 1160F PR REVIEW ALL MEDS BY PRESCRIBER/CLIN PHARMACIST DOCUMENTED: ICD-10-PCS | Mod: CPTII,S$GLB,, | Performed by: NURSE PRACTITIONER

## 2022-04-13 PROCEDURE — 51701 INSERT BLADDER CATHETER: CPT | Mod: S$GLB,,, | Performed by: NURSE PRACTITIONER

## 2022-04-13 PROCEDURE — 1159F MED LIST DOCD IN RCRD: CPT | Mod: CPTII,S$GLB,, | Performed by: NURSE PRACTITIONER

## 2022-04-13 PROCEDURE — 74178 CT ABD&PLV WO CNTR FLWD CNTR: CPT | Mod: TC

## 2022-04-13 PROCEDURE — 3288F PR FALLS RISK ASSESSMENT DOCUMENTED: ICD-10-PCS | Mod: CPTII,S$GLB,, | Performed by: NURSE PRACTITIONER

## 2022-04-13 RX ORDER — FLASH GLUCOSE SENSOR
KIT MISCELLANEOUS
COMMUNITY
Start: 2022-03-07

## 2022-04-13 RX ORDER — FLUCONAZOLE 150 MG/1
150 TABLET ORAL DAILY
Qty: 1 TABLET | Refills: 0 | Status: SHIPPED | OUTPATIENT
Start: 2022-04-13 | End: 2022-04-14

## 2022-04-13 RX ORDER — CIPROFLOXACIN 500 MG/1
500 TABLET ORAL 2 TIMES DAILY
Qty: 10 TABLET | Refills: 0 | Status: SHIPPED | OUTPATIENT
Start: 2022-04-13 | End: 2022-04-18

## 2022-04-13 RX ORDER — EMPAGLIFLOZIN 25 MG/1
25 TABLET, FILM COATED ORAL DAILY
COMMUNITY
Start: 2022-03-03

## 2022-04-13 RX ORDER — NITROFURANTOIN 25; 75 MG/1; MG/1
100 CAPSULE ORAL 2 TIMES DAILY
COMMUNITY
Start: 2022-03-23 | End: 2022-04-13

## 2022-04-13 RX ADMIN — IOHEXOL 125 ML: 350 INJECTION, SOLUTION INTRAVENOUS at 02:04

## 2022-04-13 NOTE — PROGRESS NOTES
"[facility] Note  Staff: ROB Kiser    PCP: MD Rodney    Chief Complaint: Frequent UTI symptoms    Subjective:        HPI: Benji Ring is a 66 y.o. female NEW PT presents in office today for evaluation of recurrent urinary tract infections at this time.  She has had "1" UTI every month for at least the past 6 months or more.    Hx of Urine Cultures:  2022:  E. Coli >100,000; treated with Macrobid BID x 10 days.  2022:  E. Coli >100,000; treated with Bactrim, then switched to Macrobid      Family Hx of  Cancers?:  None  Mother had ovarian cancer and  at age 61.  Hx of gross hematuria?:  Yes, few months ago.  Hx of kidney stones?:  Yes, last episode was 2-3 years ago., had ESWL.  Use to see Marium Heredia-Endocrinologist for Diabetes Mellitus management and treatment.  Last Hgb A1C done 22 was 8.0    REVIEW OF SYSTEMS:  A comprehensive 10 system review was performed and is negative except as noted above in HPI    PMHx:  Past Medical History:   Diagnosis Date    Breast cancer     Recurrent    Diabetes     Early    Hypertension      PSHx:  Past Surgical History:   Procedure Laterality Date    BILATERAL MASTECTOMY      BREAST RECONSTRUCTION      x2    ENDOSCOPIC ULTRASOUND OF UPPER GASTROINTESTINAL TRACT Left 3/5/2021    Procedure: ULTRASOUND, UPPER GI TRACT, ENDOSCOPIC;  Surgeon: Teja Bass MD;  Location: [facility];  Service: Endoscopy;  Laterality: Left;  GIF plus Linear    ESOPHAGOGASTRODUODENOSCOPY N/A 3/5/2021    Procedure: EGD (ESOPHAGOGASTRODUODENOSCOPY);  Surgeon: Teja Bass MD;  Location: [facility];  Service: Endoscopy;  Laterality: N/A;    HYSTERECTOMY      MANDIBLE SURGERY      MASTECTOMY, RADICAL       Allergies:  Meperidine, Shingrix (pf) [varicella-zoster [id number] (pf)], Demerol (pf) [meperidine (pf)], Ibuprofen, and Naprosyn [naproxen]    Medications: reviewed     Objective:     Vitals:    " 04/13/22 0946   BP: (!) 143/83   Pulse: 82   Resp: 18     Physical Exam  Vitals reviewed.   Constitutional:       Appearance: She is well-developed.   HENT:      Head: Normocephalic and atraumatic.   Eyes:      Conjunctiva/sclera: Conjunctivae normal.      Pupils: Pupils are equal, round, and reactive to light.   Cardiovascular:      Rate and Rhythm: Normal rate and regular rhythm.      Heart sounds: Normal heart sounds.   Pulmonary:      Effort: Pulmonary effort is normal.      Breath sounds: Normal breath sounds.   Abdominal:      General: Bowel sounds are normal.      Palpations: Abdomen is soft.   Musculoskeletal:         General: Normal range of motion.      Cervical back: Normal range of motion and neck supple.   Skin:     General: Skin is warm and dry.   Neurological:      Mental Status: She is alert and oriented to person, place, and time.      Deep Tendon Reflexes: Reflexes are normal and symmetric.   Psychiatric:         Behavior: Behavior normal.         Thought Content: Thought content normal.         Judgment: Judgment normal.      EXAM performed by me in office today:  External Genitalia: normal hair distribution, no lesions  Urethral meatus: normal without prolapse or caruncle  Urethra: without tenderness or mass  Bladder: without fulness or tenderness  Anus and perineum: appear normal  14 fr in and out cath performed by me with 85 mL of urine residual  +Atrophic Vaginitis observed on exam.    LABS REVIEW:  UA today: (Clean Catch)  Color:Clear, Yellow  Spec. Grav.  1.020  PH  7.0  Trace leukocytes  Moderate blood in urine  500 of glucose  30 Protein  Assessment:       1. Recurrent UTI    2. UTI symptoms    3. Urinary tract infection with hematuria, site unspecified    4. Personal history of kidney stones          Plan:     1. Urine culture and UA Micro to be processed from cath'd specimen today.  2. CTU with and without contrast to be scheduled with serum cr for further evaluation of her symptoms at  this time.  3. In the meantime I have prescribed pt Cipro 500 mg BID x 5 days for infection.    F/u-Our office will contact this pt after we receive and review ALL imaging and lab results to determine best POC for this patient.  Pt verbalized understanding at conclusion of appointment today.      For your recurrent UTIs:  Behavioral changes to help decrease UTI recurrences   -increase water intake (6 to 8 bottles water per day)   -don't hold urine, void every 2 to 3 hours   -prevent constipation (miralax capful daily if necessary) to have a bowel movement daily and keep stools soft  -cut down on caffeine,drink mainly water  -no douching   -void immediately after sexual intercourse  -wipe front to back     OTC meds to try (go to the pharmacist and ask where they are, they are over the counter)  -Use lactobacillus probiotic in capsule form in vagina once nightly for 10 days if you feel like you have an infection coming on   -cranberry pills 500mg tabs TID, can buy over the counter  -take a probiotic daily  -D-Mannose once daily over the counter    IMPORTANT: If you feel like you have a UTI coming on, call our office and talk to one of the nurses. We will have you drop off a urine here in clinic or at one of our [facility] facilities. I do not typically like to write for antibiotics unless we have a urine culture. Avoid going to urgent care. We need to start documenting your urine cultures here in our office to see if they are really recurrent UTIs or sx of UTIs.     If it is over the weekend and you cannot wait, call our on call doctor, however we still prefer urine cultures before giving antibiotics.     If you have fever and it doesn't improve with tylenol or ibuprofen or if you have flank pain associated with the uti symptoms go to the ER.     If you do continue to have positive urine cultures then we may proceed with doing a cystoscopy (to look in your bladder) and an imaging study.     [facility]: Earl Little  R Jose A, SELENEP-C

## 2022-04-15 LAB — BACTERIA UR CULT: ABNORMAL

## 2022-04-18 RX ORDER — CIPROFLOXACIN 500 MG/1
500 TABLET ORAL 2 TIMES DAILY
Qty: 10 TABLET | Refills: 0 | Status: SHIPPED | OUTPATIENT
Start: 2022-04-18 | End: 2022-04-23

## 2022-05-06 ENCOUNTER — PATIENT OUTREACH (OUTPATIENT)
Dept: ADMINISTRATIVE | Facility: HOSPITAL | Age: 67
End: 2022-05-06
Payer: COMMERCIAL

## 2022-05-06 NOTE — PROGRESS NOTES
Non-compliant report chart audits HGBA1C.        Care Everywhere and media, updates requested and reviewed.      [facility] and [facility] reviewed for tests needed.    Outreach to patient    No answer; Mailbox full    Outreach:  [facility]BA1C

## 2022-05-24 ENCOUNTER — CLINICAL SUPPORT (OUTPATIENT)
Dept: UROLOGY | Facility: CLINIC | Age: 67
End: 2022-05-24
Payer: COMMERCIAL

## 2022-05-24 DIAGNOSIS — R39.9 UTI SYMPTOMS: Primary | ICD-10-CM

## 2022-05-24 PROCEDURE — 87086 URINE CULTURE/COLONY COUNT: CPT | Performed by: NURSE PRACTITIONER

## 2022-05-24 PROCEDURE — 99499 NO LOS: ICD-10-PCS | Mod: S$GLB,,, | Performed by: NURSE PRACTITIONER

## 2022-05-24 PROCEDURE — 99499 UNLISTED E&M SERVICE: CPT | Mod: S$GLB,,, | Performed by: NURSE PRACTITIONER

## 2022-05-24 NOTE — PROGRESS NOTES
Pt arrived in clinic for urine culture specimen  Pt instructed to wipe vaginal area front to back, void in toilet, then void in specimen cup  50cc's yellow clear urine voided in specimen cup, labeled and sent to lab to be tested  Pt left clinic in satisfactory condition

## 2022-05-25 LAB — BACTERIA UR CULT: NO GROWTH

## 2022-05-31 ENCOUNTER — PATIENT MESSAGE (OUTPATIENT)
Dept: ADMINISTRATIVE | Facility: HOSPITAL | Age: 67
End: 2022-05-31
Payer: COMMERCIAL

## 2022-06-29 ENCOUNTER — TELEPHONE (OUTPATIENT)
Dept: UROLOGY | Facility: CLINIC | Age: 67
End: 2022-06-29
Payer: COMMERCIAL

## 2022-06-29 DIAGNOSIS — N39.0 RECURRENT UTI: ICD-10-CM

## 2022-06-29 DIAGNOSIS — R39.9 UTI SYMPTOMS: Primary | ICD-10-CM

## 2022-06-29 NOTE — TELEPHONE ENCOUNTER
Yes, pt can drop off urine sample at [facility] outpatient lab today.  I will put urine culture in, just let her know I will be out of town next week, therefore she will need to email the office or call to get her results in 48-72 hours.  (If she drops off today, we may or may not have results by Friday, if not, she needs to call office Monday morning)    Thanks.

## 2022-06-29 NOTE — TELEPHONE ENCOUNTER
Pt states she cannot make it to [facility] < has grandchildren, and will go to urgent care closer to home( [facility]). Advised pt to have culture done on urine and fax results to [phone] attn: moy Osorio vu

## 2022-06-29 NOTE — TELEPHONE ENCOUNTER
----- Message from Deborah Duke MA sent at 6/29/2022 10:10 AM CDT -----  Contact: patient  Do u want pt to drop off sample?  Please advise  ----- Message -----  From: Alba Wolff  Sent: 6/29/2022   9:12 AM CDT  To: Jose A [facility] Staff    Type: Needs Medical Advice  Who Called:  patient  Symptoms (please be specific):  UTI  Pharmacy name and phone #:    [facility] [address] [place] - [address]  [address] [place] [address]  Phone: [phone] Fax: [phone]  Best Call Back Number: [phone] (home) [phone] (work)  Additional Information: patient is requesting a refill of the medication she received in April for her UTI

## 2022-06-29 NOTE — TELEPHONE ENCOUNTER
----- Message from Alba Wolff sent at 6/29/2022  9:11 AM CDT -----  Contact: patient  Type: Needs Medical Advice  Who Called:  patient  Symptoms (please be specific):  UTI  Pharmacy name and phone #:    [facility] - [place] - [address]  [address]  [place] [address]  Phone: [phone] Fax: [phone]  Best Call Back Number: [phone] (home) [phone] (work)  Additional Information: patient is requesting a refill of the medication she received in April for her UTI

## 2022-06-30 ENCOUNTER — OFFICE VISIT (OUTPATIENT)
Dept: URGENT CARE | Facility: CLINIC | Age: 67
End: 2022-06-30
Payer: COMMERCIAL

## 2022-06-30 VITALS
OXYGEN SATURATION: 99 % | HEIGHT: 63 IN | WEIGHT: 127 LBS | TEMPERATURE: 98 F | HEART RATE: 80 BPM | BODY MASS INDEX: 22.5 KG/M2 | DIASTOLIC BLOOD PRESSURE: 81 MMHG | SYSTOLIC BLOOD PRESSURE: 131 MMHG | RESPIRATION RATE: 16 BRPM

## 2022-06-30 DIAGNOSIS — R35.0 URINARY FREQUENCY: ICD-10-CM

## 2022-06-30 DIAGNOSIS — N39.0 URINARY TRACT INFECTION WITH HEMATURIA, SITE UNSPECIFIED: Primary | ICD-10-CM

## 2022-06-30 DIAGNOSIS — R31.9 URINARY TRACT INFECTION WITH HEMATURIA, SITE UNSPECIFIED: Primary | ICD-10-CM

## 2022-06-30 DIAGNOSIS — R30.0 DYSURIA: ICD-10-CM

## 2022-06-30 LAB
BILIRUB UR QL STRIP: POSITIVE
CLARITY FLD: CLEAR
COLOR UR: YELLOW
GLUCOSE UR QL STRIP: POSITIVE
KETONES UR QL STRIP: NEGATIVE
LEUKOCYTE ESTERASE UR QL STRIP: POSITIVE
PH, POC UA: 5.5 (ref 5–8)
POC BLOOD, URINE: POSITIVE
POC NITRATES, URINE: POSITIVE
PROT UR QL STRIP: POSITIVE
SP GR UR STRIP: 1.02 (ref 1–1.03)
UROBILINOGEN UR STRIP-ACNC: NORMAL (ref 0.1–1.1)

## 2022-06-30 PROCEDURE — 3008F BODY MASS INDEX DOCD: CPT | Mod: CPTII,S$GLB,, | Performed by: NURSE PRACTITIONER

## 2022-06-30 PROCEDURE — 4010F PR ACE/ARB THEARPY RXD/TAKEN: ICD-10-PCS | Mod: CPTII,S$GLB,, | Performed by: NURSE PRACTITIONER

## 2022-06-30 PROCEDURE — 87086 URINE CULTURE/COLONY COUNT: CPT | Performed by: NURSE PRACTITIONER

## 2022-06-30 PROCEDURE — 3079F PR MOST RECENT DIASTOLIC BLOOD PRESSURE 80-89 MM HG: ICD-10-PCS | Mod: CPTII,S$GLB,, | Performed by: NURSE PRACTITIONER

## 2022-06-30 PROCEDURE — 4010F ACE/ARB THERAPY RXD/TAKEN: CPT | Mod: CPTII,S$GLB,, | Performed by: NURSE PRACTITIONER

## 2022-06-30 PROCEDURE — 81003 URINALYSIS AUTO W/O SCOPE: CPT | Mod: QW,S$GLB,, | Performed by: NURSE PRACTITIONER

## 2022-06-30 PROCEDURE — 87077 CULTURE AEROBIC IDENTIFY: CPT | Performed by: NURSE PRACTITIONER

## 2022-06-30 PROCEDURE — 1160F PR REVIEW ALL MEDS BY PRESCRIBER/CLIN PHARMACIST DOCUMENTED: ICD-10-PCS | Mod: CPTII,S$GLB,, | Performed by: NURSE PRACTITIONER

## 2022-06-30 PROCEDURE — 3075F PR MOST RECENT SYSTOLIC BLOOD PRESS GE 130-139MM HG: ICD-10-PCS | Mod: CPTII,S$GLB,, | Performed by: NURSE PRACTITIONER

## 2022-06-30 PROCEDURE — 99203 PR OFFICE/OUTPT VISIT, NEW, LEVL III, 30-44 MIN: ICD-10-PCS | Mod: S$GLB,,, | Performed by: NURSE PRACTITIONER

## 2022-06-30 PROCEDURE — 1125F AMNT PAIN NOTED PAIN PRSNT: CPT | Mod: CPTII,S$GLB,, | Performed by: NURSE PRACTITIONER

## 2022-06-30 PROCEDURE — 3072F PR LOW RISK FOR RETINOPATHY: ICD-10-PCS | Mod: CPTII,S$GLB,, | Performed by: NURSE PRACTITIONER

## 2022-06-30 PROCEDURE — 87186 SC STD MICRODIL/AGAR DIL: CPT | Performed by: NURSE PRACTITIONER

## 2022-06-30 PROCEDURE — 1160F RVW MEDS BY RX/DR IN RCRD: CPT | Mod: CPTII,S$GLB,, | Performed by: NURSE PRACTITIONER

## 2022-06-30 PROCEDURE — 3008F PR BODY MASS INDEX (BMI) DOCUMENTED: ICD-10-PCS | Mod: CPTII,S$GLB,, | Performed by: NURSE PRACTITIONER

## 2022-06-30 PROCEDURE — 1159F PR MEDICATION LIST DOCUMENTED IN MEDICAL RECORD: ICD-10-PCS | Mod: CPTII,S$GLB,, | Performed by: NURSE PRACTITIONER

## 2022-06-30 PROCEDURE — 3079F DIAST BP 80-89 MM HG: CPT | Mod: CPTII,S$GLB,, | Performed by: NURSE PRACTITIONER

## 2022-06-30 PROCEDURE — 1159F MED LIST DOCD IN RCRD: CPT | Mod: CPTII,S$GLB,, | Performed by: NURSE PRACTITIONER

## 2022-06-30 PROCEDURE — 3052F PR MOST RECENT HEMOGLOBIN A1C LEVEL 8.0 - < 9.0%: ICD-10-PCS | Mod: CPTII,S$GLB,, | Performed by: NURSE PRACTITIONER

## 2022-06-30 PROCEDURE — 81003 POCT URINALYSIS, DIPSTICK, AUTOMATED, W/O SCOPE: ICD-10-PCS | Mod: QW,S$GLB,, | Performed by: NURSE PRACTITIONER

## 2022-06-30 PROCEDURE — 3075F SYST BP GE 130 - 139MM HG: CPT | Mod: CPTII,S$GLB,, | Performed by: NURSE PRACTITIONER

## 2022-06-30 PROCEDURE — 3052F HG A1C>EQUAL 8.0%<EQUAL 9.0%: CPT | Mod: CPTII,S$GLB,, | Performed by: NURSE PRACTITIONER

## 2022-06-30 PROCEDURE — 99203 OFFICE O/P NEW LOW 30 MIN: CPT | Mod: S$GLB,,, | Performed by: NURSE PRACTITIONER

## 2022-06-30 PROCEDURE — 3072F LOW RISK FOR RETINOPATHY: CPT | Mod: CPTII,S$GLB,, | Performed by: NURSE PRACTITIONER

## 2022-06-30 PROCEDURE — 87088 URINE BACTERIA CULTURE: CPT | Performed by: NURSE PRACTITIONER

## 2022-06-30 PROCEDURE — 1125F PR PAIN SEVERITY QUANTIFIED, PAIN PRESENT: ICD-10-PCS | Mod: CPTII,S$GLB,, | Performed by: NURSE PRACTITIONER

## 2022-06-30 RX ORDER — CIPROFLOXACIN 500 MG/1
500 TABLET ORAL 2 TIMES DAILY
Qty: 14 TABLET | Refills: 0 | Status: SHIPPED | OUTPATIENT
Start: 2022-06-30 | End: 2022-07-07

## 2022-06-30 RX ORDER — PHENAZOPYRIDINE HYDROCHLORIDE 200 MG/1
200 TABLET, FILM COATED ORAL 3 TIMES DAILY PRN
Qty: 30 TABLET | Refills: 0 | Status: SHIPPED | OUTPATIENT
Start: 2022-06-30

## 2022-06-30 RX ORDER — FLUCONAZOLE 150 MG/1
150 TABLET ORAL ONCE
Qty: 1 TABLET | Refills: 0 | Status: SHIPPED | OUTPATIENT
Start: 2022-06-30 | End: 2022-06-30

## 2022-06-30 NOTE — PROGRESS NOTES
"Subjective:       Patient ID: Benji Ring is a 66 y.o. female.    Vitals:  height is 5' 3" (1.6 m) and weight is 57.6 kg (127 lb). Her temperature is 97.8 °F (36.6 °C). Her blood pressure is 131/81 and her pulse is 80. Her respiration is 16 and oxygen saturation is 99%.     Chief Complaint: Urinary Tract Infection    Urinary urgency, frequency and burning while urinating that started 2-3 days ago   Patient does have chronic history of UTI   Patient did take OTC medications with mild relief.   Patient states her doctors office wants her to have a urine culture done, not just a rapid UA    Provider note begins below:  66-year-old female with PMHx of diabetes, hypertension and frequent UTIs here today with complaints of urinary frequency, urgency and dysuria for the past 2-3 days.  Patient reports that this feels like a UTI.  Patient reports some chills.  Denies fever, vomiting, diarrhea, abdominal pain, back pain, CVA tenderness, hematuria, vaginal bleeding/discharge. Afebrile.  Patient has been taking azo OTC for the discomfort with some relief.  Seeing Urology for frequent UTIs.    Urinary Tract Infection   This is a new problem. The current episode started in the past 7 days. The problem occurs every urination. The problem has been gradually worsening. The quality of the pain is described as burning. The pain is at a severity of 7/10. The pain is moderate. There has been no fever. Associated symptoms include frequency and urgency. Pertinent negatives include no behavior changes, chills, discharge, flank pain, hematuria, hesitancy, nausea, possible pregnancy, sweats, vomiting, weight loss, bubble bath use, constipation, rash or withholding. Treatments tried: OTC meds. The treatment provided mild relief. Her past medical history is significant for recurrent UTIs. There is no history of catheterization, diabetes insipidus, diabetes mellitus, genitourinary reflux, hypertension, kidney stones, a single kidney, STD, " urinary stasis or a urological procedure.       Constitution: Negative. Negative for chills, sweating and fatigue.   HENT: Negative.  Negative for ear pain, facial swelling, congestion and sore throat.    Neck: Negative for painful lymph nodes.   Cardiovascular: Negative.  Negative for chest trauma, chest pain and sob on exertion.   Eyes: Negative.  Negative for eye itching and eye pain.   Respiratory: Negative.  Negative for chest tightness, cough and asthma.    Gastrointestinal: Negative.  Negative for nausea, vomiting, constipation and diarrhea.   Endocrine: negative. cold intolerance and excessive thirst.   Genitourinary: Positive for dysuria, frequency and urgency. Negative for flank pain and hematuria.   Musculoskeletal: Negative for pain, trauma and joint pain.   Skin: Negative.  Negative for rash, wound and hives.   Allergic/Immunologic: Negative.  Negative for eczema, asthma, hives and itching.   Neurological: Negative.  Negative for disorientation and altered mental status.   Hematologic/Lymphatic: Negative.  Negative for swollen lymph nodes.   Psychiatric/Behavioral: Negative.  Negative for altered mental status, disorientation and confusion.       Objective:      Physical Exam   Constitutional: She is oriented to person, place, and time. She appears well-developed.  Non-toxic appearance. She does not appear ill. No distress.   HENT:   Head: Normocephalic and atraumatic.   Ears:   Right Ear: External ear normal.   Left Ear: External ear normal.   Nose: Nose normal. No nasal deformity. No epistaxis.   Mouth/Throat: Oropharynx is clear and moist and mucous membranes are normal.   Eyes: Lids are normal.   Neck: Trachea normal and phonation normal. Neck supple.   Cardiovascular: Normal pulses.   Pulmonary/Chest: Effort normal and breath sounds normal. No stridor. No respiratory distress. She has no wheezes. She has no rhonchi. She has no rales. She exhibits no tenderness.   Abdominal: Normal appearance and  bowel sounds are normal. She exhibits no distension. Soft. There is no abdominal tenderness. There is no left CVA tenderness.   Genitourinary:    No vaginal discharge.     Musculoskeletal: Normal range of motion.         General: Normal range of motion.   Neurological: no focal deficit. She is alert and oriented to person, place, and time.   Skin: Skin is warm, dry, intact and not diaphoretic.   Psychiatric: Her speech is normal and behavior is normal. Mood normal.   Nursing note and vitals reviewed.    The following results have been reviewed with the patient:  LABS-  Results for orders placed or performed in visit on 06/30/22   POCT Urinalysis, Dipstick, Automated, W/O Scope   Result Value Ref Range    POC Blood, Urine Positive (A) Negative    POC Bilirubin, Urine Positive (A) Negative    POC Urobilinogen, Urine normal 0.1 - 1.1    POC Ketones, Urine Negative Negative    POC Protein, Urine Positive (A) Negative    POC Nitrates, Urine Positive (A) Negative    POC Glucose, Urine Positive (A) Negative    pH, UA 5.5 5 - 8    POC Specific Gravity, Urine 1.020 1.003 - 1.029    POC Leukocytes, Urine Positive (A) Negative    Color, UA Yellow Light Yellow, Yellow    Clarity, Body Fluid Clear         IMAGING-  No results found.      Assessment:       1. Urinary tract infection with hematuria, site unspecified    2. Urinary frequency    3. Dysuria          Plan:       FOLLOWUP  Follow up if symptoms worsen or fail to improve, for PLEASE CONTACT PCP OR CONTACT THE EMERGENCY ROOM..     PATIENT INSTRUCTIONS  Patient Instructions   INSTRUCTIONS:  - Rest.  - Drink plenty of fluids.  - Take Tylenol and/or Ibuprofen as directed as needed for fever/pain.  Do not take more than the recommended dose.  - follow up with your PCP within the next 1-2 weeks as needed.  - You must understand that you have received an Urgent Care treatment only and that you may be released before all of your medical problems are known or treated.   - You,  the patient, will arrange for follow up care as instructed.   - If your condition worsens or fails to improve we recommend that you receive another evaluation at the ER immediately or contact your PCP to discuss your concerns.   - You can call [phone] or [phone] to help schedule an appointment with the appropriate provider.              THANK YOU FOR ALLOWING ME TO PARTICIPATE IN YOUR HEALTHCARE,     Sung Duff, NP   Urinary tract infection with hematuria, site unspecified  -     ciprofloxacin HCl (CIPRO) 500 MG tablet; Take 1 tablet (500 mg total) by mouth 2 (two) times daily. for 7 days  Dispense: 14 tablet; Refill: 0  -     Culture, Urine  -     fluconazole (DIFLUCAN) 150 MG Tab; Take 1 tablet (150 mg total) by mouth once. for 1 dose  Dispense: 1 tablet; Refill: 0    Urinary frequency  -     POCT Urinalysis, Dipstick, Automated, W/O Scope    Dysuria  -     phenazopyridine (PYRIDIUM) 200 MG tablet; Take 1 tablet (200 mg total) by mouth 3 (three) times daily as needed for Pain.  Dispense: 30 tablet; Refill: 0

## 2022-06-30 NOTE — PATIENT INSTRUCTIONS
INSTRUCTIONS:  - Rest.  - Drink plenty of fluids.  - Take Tylenol and/or Ibuprofen as directed as needed for fever/pain.  Do not take more than the recommended dose.  - follow up with your PCP within the next 1-2 weeks as needed.  - You must understand that you have received an Urgent Care treatment only and that you may be released before all of your medical problems are known or treated.   - You, the patient, will arrange for follow up care as instructed.   - If your condition worsens or fails to improve we recommend that you receive another evaluation at the ER immediately or contact your PCP to discuss your concerns.   - You can call [phone] or [phone] to help schedule an appointment with the appropriate provider.

## 2022-07-03 ENCOUNTER — TELEPHONE (OUTPATIENT)
Dept: URGENT CARE | Facility: CLINIC | Age: 67
End: 2022-07-03
Payer: COMMERCIAL

## 2022-07-03 LAB — BACTERIA UR CULT: ABNORMAL

## 2022-07-03 NOTE — TELEPHONE ENCOUNTER
Spoke with patient regarding positive urine culture.  She was treated appropriately with Cipro at time of visit.  She states her symptoms are improving.  Take antibiotics to completion.  Patient verbalized understanding and all of her questions were answered.

## 2022-07-03 NOTE — TELEPHONE ENCOUNTER
Attempted to call patient and left a voicemail asking patient to return call to clinic.  Her urine culture is positive at this time for E coli however she was treated appropriately at time of visit with ciprofloxacin.  Was calling to see how she is feeling.

## 2022-08-04 ENCOUNTER — PATIENT MESSAGE (OUTPATIENT)
Dept: OPTOMETRY | Facility: CLINIC | Age: 67
End: 2022-08-04
Payer: COMMERCIAL

## 2024-08-14 NOTE — PROGRESS NOTES
Physical Therapy Daily Treatment Note     Name: Benji Ring  Clinic Number: [id number]    Therapy Diagnosis:   Encounter Diagnoses   Name Primary?    Cervical radicular pain     Decreased ROM of neck      Physician: Ramiro Laws,*    Visit Date: 10/28/2019  Visit Date: 10/16/2019   Physician Orders: PT Eval and Treat   Medical Diagnosis from Referral: left cervical radiculopathy  Evaluation Date: 10/9/2019  Authorization Period Expiration: 12/31/19  Plan of Care Expiration: 12/6/19  Visit # / Visits authorized: 6/ 25     Time In: 4:55PM  Time Out: 5:40PM  Total Billable Time: 45 minutes    Precautions: Standard    Subjective     Pt reports: pain radiating to left shoulder only.  She was compliant with home exercise program.  Response to previous treatment: centralization of sx  Functional change: sleeping better, looking down without radicular sx    Pain: 3/10  Location: left neck      Objective       Benji received therapeutic exercises to develop strength, ROM, flexibility and posture for 15 minutes including:  UBE 2 min retro  Sitting cervical retraction 5x 5sec  Scapular retraction x10  Sitting cervical extension x10  Supine retraction 20x  Supine ext 20x  Supine rotation 20x B  Seated L upper trap stretch 30 sec x 2   Seated L levator scap stretch 30 sec x 2     Benji received the following manual therapy techniques: Myofacial release and Soft tissue Mobilization were applied to the: left cervical musculature for 15 minutes, including:  STM B UT stretch, B lev scap stretch    Benji received the following supervised modalities after being cleared for contradictions: Mechanical Traction:  Benji received intermittent mechanical traction to the cervical spine at a force of 17 pounds max and 8 pounds min for a total of 15 minutes. Hold time of 30 seconds and rest time for 10 seconds. Patient tolerated treatment well without any adverse effects.     Home Exercises Provided and Patient Education  School physical form pre-filled and in basket for signature. Pt aware provider out until tomorrow   Provided     Education provided:   - effects of therapy  - centralization of symptoms     Written Home Exercises Provided: Patient instructed to cont prior HEP.  Exercises were reviewed and Benji was able to demonstrate them prior to the end of the session.  Benji demonstrated good  understanding of the education provided.     See EMR under Patient Instructions for exercises provided 10/09/19.    Assessment     Sx continue to centralize. Decreased palpable tightness of left cervical musculature. Improved left cervical rotation without provoking radicular sx. Needs cueing to achieve proper cervical retraction.    Benji is progressing well towards her goals.   Pt prognosis is Good.     Pt will continue to benefit from skilled outpatient physical therapy to address the deficits listed in the problem list box on initial evaluation, provide pt/family education and to maximize pt's level of independence in the home and community environment.     Pt's spiritual, cultural and educational needs considered and pt agreeable to plan of care and goals.    Anticipated barriers to physical therapy: none    Goals:   Short Term Goals: 4 weeks   Pt will have centralization of sx not radiating past upper arm and less than 2/10. MET 10/23/19  Pt will be able to flex and rotate cervical spine to right without increase of left UE sx. MET 10/23/19  Pt will be able to sleep through night awakening with decreased pain. MET 10/23/19     Long Term Goals: 8 weeks   Pt will have centralization of sx to neck only with pain less than 2/10. PROGRESSING  Pt will be independent with HEP for sx management. PROGRESSING    Plan     Continue current POC with emphasis on resolving LUE radicular symptoms and decreasing pain.     Tara oWody, PT

## 2024-09-04 DIAGNOSIS — M79.672 LEFT FOOT PAIN: Primary | ICD-10-CM

## 2024-09-05 ENCOUNTER — OFFICE VISIT (OUTPATIENT)
Dept: ORTHOPEDICS | Facility: CLINIC | Age: 69
End: 2024-09-05
Payer: MEDICARE

## 2024-09-05 ENCOUNTER — HOSPITAL ENCOUNTER (OUTPATIENT)
Dept: RADIOLOGY | Facility: HOSPITAL | Age: 69
Discharge: HOME | End: 2024-09-05
Attending: ORTHOPAEDIC SURGERY
Payer: MEDICARE

## 2024-09-05 ENCOUNTER — TELEPHONE (OUTPATIENT)
Dept: ORTHOPEDICS | Facility: CLINIC | Age: 69
End: 2024-09-05

## 2024-09-05 DIAGNOSIS — M79.672 LEFT FOOT PAIN: ICD-10-CM

## 2024-09-05 DIAGNOSIS — S92.512A CLOSED DISPLACED FRACTURE OF PROXIMAL PHALANX OF LESSER TOE OF LEFT FOOT, INITIAL ENCOUNTER: ICD-10-CM

## 2024-09-05 PROCEDURE — 73630 X-RAY EXAM OF FOOT: CPT | Mod: TC,PO,LT

## 2024-09-05 PROCEDURE — 99999 PR PBB SHADOW E&M-EST. PATIENT-LVL III: CPT | Mod: PBBFAC,,, | Performed by: ORTHOPAEDIC SURGERY

## 2024-09-05 PROCEDURE — 73630 X-RAY EXAM OF FOOT: CPT | Mod: 26,LT,, | Performed by: RADIOLOGY

## 2024-09-05 NOTE — TELEPHONE ENCOUNTER
Notified pt that she can return her post op shoe that she received from urgent care so she will not be charged for the two. The po shoe she received from urgent care is too big. Also notified pt she will need to sign a pickup delivery ticket when she comes to return brace.

## 2024-09-05 NOTE — PROGRESS NOTES
Status/Diagnosis: Displaced Left intra-articular 4th toe P1 fracture.  Date of Surgery: none  Date of Injury: 07/15/2024  Return visit: 3 weeks  X-rays on Return: WB 3-views Left foot    Chief Complaint:   Chief Complaint   Patient presents with    Left Foot - Pain, Injury     Present History:  [facility]   Benji Ring is a 68 y.o. female who presents today for new patient evaluation.  Reports stubbing her left 4th toe on a bed frame approximately 3 weeks ago.  Immediate pain and swelling but still able to bear weight.  Delayed presentation but Seen at local ED on 09/02/2024 at which time x-rays were taken and consistent with fracture.  No instructions for ignacio taping but she was placed into a postoperative shoe however this is inappropriately sized.  At least 2 sizes too big.    4/10 pain today.  Denies any numbness or tingling.    Past medical history significant for hypertension and type 2 diabetes, A1c of 8.0 (from 2022) Denies tobacco use.  Remains active.      Past Medical History:   Diagnosis Date    Breast cancer     Recurrent    Diabetes     Early    Hypertension        Past Surgical History:   Procedure Laterality Date    BILATERAL MASTECTOMY      BREAST RECONSTRUCTION      x2    ENDOSCOPIC ULTRASOUND OF UPPER GASTROINTESTINAL TRACT Left 3/5/2021    Procedure: ULTRASOUND, UPPER GI TRACT, ENDOSCOPIC;  Surgeon: Teja Bass MD;  Location: [facility];  Service: Endoscopy;  Laterality: Left;  GIF plus Linear    ENDOSCOPIC ULTRASOUND OF UPPER GASTROINTESTINAL TRACT Left 3/1/2023    Procedure: ULTRASOUND, UPPER GI TRACT, ENDOSCOPIC;  Surgeon: Teja Bass MD;  Location: [facility];  Service: Endoscopy;  Laterality: Left;    ESOPHAGOGASTRODUODENOSCOPY N/A 3/5/2021    Procedure: EGD (ESOPHAGOGASTRODUODENOSCOPY);  Surgeon: Teja Bass MD;  Location: [facility];  Service: Endoscopy;  Laterality: N/A;    ESOPHAGOGASTRODUODENOSCOPY N/A 3/1/2023     Procedure: EGD (ESOPHAGOGASTRODUODENOSCOPY);  Surgeon: Teja Bass MD;  Location: [facility];  Service: Endoscopy;  Laterality: N/A;    HYSTERECTOMY      MANDIBLE SURGERY      MASTECTOMY, RADICAL         Current Outpatient Medications   Medication Sig    atenoloL (TENORMIN) 50 MG tablet Take 1 tablet by mouth once daily    atorvastatin (LIPITOR) 10 MG tablet Take 10 mg by mouth once daily.    calcium carbonate (OS-DARI) 500 mg calcium (1,250 mg) tablet Take 2 tablets by mouth once daily.    cholecalciferol, vitamin D3, (VITAMIN D3) 50 mcg (2,000 unit) Cap Take 1 capsule by mouth once daily.    cranberry 400 mg Cap Take 1,000 mg by mouth once daily.    FREESTYLE LIS 2 SENSOR Kit APPLY A NEW SENSOR EVERY 14 DAYS    glimepiride (AMARYL) 4 MG tablet     JARDIANCE 25 mg tablet Take 25 mg by mouth once daily.    Lactobacillus rhamnosus GG (CULTURELLE) 10 billion cell capsule Take 1 capsule by mouth once daily.    losartan (COZAAR) 50 MG tablet Take 1 tablet by mouth once daily    MULTIVITAMIN W-MINERALS/LUTEIN (CENTRUM SILVER ORAL) Take 1 tablet by mouth once daily.    omega-3 fatty acids-vitamin E 1,000 mg Cap Take 2 tablets by mouth before breakfast.    ONETOUCH DELICA PLUS LANCET 30 gauge Misc USE 1 TO CHECK GLUCOSE THREE TIMES DAILY    ONETOUCH VERIO TEST STRIPS Strp 1 strip 3 (three) times daily. To check blood sugar    OZEMPIC 0.25 mg or 0.5 mg(2 mg/1.5 mL) pen injector     paroxetine (PAXIL) 30 MG tablet     phenazopyridine (PYRIDIUM) 200 MG tablet Take 1 tablet (200 mg total) by mouth 3 (three) times daily as needed for Pain.     No current facility-administered medications for this visit.       Review of patient's allergies indicates:   Allergen Reactions    Meperidine Nausea And Vomiting and Nausea Only    Shingrix (pf) [varicella-zoster [id number] (pf)] Swelling    Demerol (pf) [meperidine (pf)]     Naprosyn [naproxen] Rash       Family History   Problem Relation Name Age of Onset    Cancer Mother       Parkinsonism Father      Cataracts Father      Diabetes Paternal Uncle         Social History     Socioeconomic History    Marital status:    Tobacco Use    Smoking status: Never    Smokeless tobacco: Never   Substance and Sexual Activity    Alcohol use: No    Drug use: No     Social Determinants of Health     Financial Resource Strain: Patient Declined (9/4/2024)    Overall Financial Resource Strain (CARDIA)     Difficulty of Paying Living Expenses: Patient declined   Food Insecurity: Patient Declined (9/4/2024)    Hunger Vital Sign     Worried About Running Out of Food in the Last Year: Patient declined     Ran Out of Food in the Last Year: Patient declined   Physical Activity: Inactive (9/4/2024)    Exercise Vital Sign     Days of Exercise per Week: 0 days     Minutes of Exercise per Session: 0 min   Stress: Stress Concern Present (9/4/2024)    [employer] [place] [employer] - Occupational Stress Questionnaire     Feeling of Stress : Rather much   Housing Stability: Unknown (9/4/2024)    Housing Stability Vital Sign     Unable to Pay for Housing in the Last Year: Patient declined       Physical exam:  There were no vitals filed for this visit.  There is no height or weight on file to calculate BMI.  General: In no apparent distress; well developed and well nourished.  HEENT: normocephalic; atraumatic.  Cardiovascular: regular rate.  Respiratory: no increased work of breathing.  Musculoskeletal:   Gait: nonantalgic  Inspection:   Mild-to-moderate residual diffuse swelling about the 4th toe.  No warmth or erythema.  No signs or symptoms of infection.  Minimal tenderness at the 4th toe proximal phalanx.  Near full 4th MTP range of motion with pain only at extremes.  No pain referable to the ankle.  No laxity with anterior drawer testing.  Silfverskiold: Negative  Alignment:  Knee: neutral               Ankle: neutral              Hindfoot: neutral              Forefoot: neutral   Strength:               Dorsiflexion 5/5  Plantar flexion 5/5  Inversion 5/5  Eversion 5/5  Sensation:              SILT distally  ROM:              Ankle: full and painless              Subtalar: full and painless  Pulses: Palpable pedal pulse                   Imaging Studies/Outside documentation:  I have ordered/reviewed/interpreted the following images/outside documentation:  1. Weight-bearing 3-views of Left foot and ankle:   On my independent review, minimally displaced intra-articular medial capsular avulsion fracture of the 4th metatarsal proximal phalangeal base.  Diffuse osteopenic changes throughout the foot.  Moderate increased calcaneal pitch.  Talonavicular uncoverage within normal limits.        Assessment:  Benji Ring is a 68 y.o. female with Displaced Left intra-articular 4th toe P1 fracture.     Plan:   Clinical and radiographic findings were discussed.  Recommend continued conservative management.  We did discuss implementation of ignacio taping.  Instructions and supplies provided today.    Patient was provided with a new, more appropriately fit postoperative shoe.    She may weight bear as tolerated left lower extremity.    May remove taping at night.    Patient voiced understanding.  All questions were answered.  Return to clinic in 3 weeks for repeat evaluation and x-ray.  Possible transition out of the postoperative shoe at that time.    We performed a custom orthotic/brace fitting, adjusting and training with the patient. The patient demonstrated understanding and proper care. This was performed for 15 minutes.    This note was created using voice recognition software and may contain grammatical errors.

## 2024-09-30 DIAGNOSIS — S92.512A CLOSED DISPLACED FRACTURE OF PROXIMAL PHALANX OF LESSER TOE OF LEFT FOOT, INITIAL ENCOUNTER: Primary | ICD-10-CM

## 2024-10-03 ENCOUNTER — OFFICE VISIT (OUTPATIENT)
Dept: ORTHOPEDICS | Facility: CLINIC | Age: 69
End: 2024-10-03
Payer: MEDICARE

## 2024-10-03 ENCOUNTER — HOSPITAL ENCOUNTER (OUTPATIENT)
Dept: RADIOLOGY | Facility: HOSPITAL | Age: 69
Discharge: HOME | End: 2024-10-03
Attending: ORTHOPAEDIC SURGERY
Payer: MEDICARE

## 2024-10-03 VITALS — BODY MASS INDEX: 21.25 KG/M2 | WEIGHT: 119.94 LBS | HEIGHT: 63 IN

## 2024-10-03 DIAGNOSIS — S92.512A CLOSED DISPLACED FRACTURE OF PROXIMAL PHALANX OF LESSER TOE OF LEFT FOOT, INITIAL ENCOUNTER: Primary | ICD-10-CM

## 2024-10-03 DIAGNOSIS — S92.512A CLOSED DISPLACED FRACTURE OF PROXIMAL PHALANX OF LESSER TOE OF LEFT FOOT, INITIAL ENCOUNTER: ICD-10-CM

## 2024-10-03 PROCEDURE — 73630 X-RAY EXAM OF FOOT: CPT | Mod: 26,LT,, | Performed by: RADIOLOGY

## 2024-10-03 PROCEDURE — 3008F BODY MASS INDEX DOCD: CPT | Mod: CPTII,S$GLB,, | Performed by: ORTHOPAEDIC SURGERY

## 2024-10-03 PROCEDURE — 4010F ACE/ARB THERAPY RXD/TAKEN: CPT | Mod: CPTII,S$GLB,, | Performed by: ORTHOPAEDIC SURGERY

## 2024-10-03 PROCEDURE — 1101F PT FALLS ASSESS-DOCD LE1/YR: CPT | Mod: CPTII,S$GLB,, | Performed by: ORTHOPAEDIC SURGERY

## 2024-10-03 PROCEDURE — 73630 X-RAY EXAM OF FOOT: CPT | Mod: TC,PO,LT

## 2024-10-03 PROCEDURE — 99213 OFFICE O/P EST LOW 20 MIN: CPT | Mod: S$GLB,,, | Performed by: ORTHOPAEDIC SURGERY

## 2024-10-03 PROCEDURE — 99999 PR PBB SHADOW E&M-EST. PATIENT-LVL III: CPT | Mod: PBBFAC,,, | Performed by: ORTHOPAEDIC SURGERY

## 2024-10-03 PROCEDURE — 1159F MED LIST DOCD IN RCRD: CPT | Mod: CPTII,S$GLB,, | Performed by: ORTHOPAEDIC SURGERY

## 2024-10-03 PROCEDURE — 1125F AMNT PAIN NOTED PAIN PRSNT: CPT | Mod: CPTII,S$GLB,, | Performed by: ORTHOPAEDIC SURGERY

## 2024-10-03 PROCEDURE — 3288F FALL RISK ASSESSMENT DOCD: CPT | Mod: CPTII,S$GLB,, | Performed by: ORTHOPAEDIC SURGERY

## 2024-10-03 PROCEDURE — 1160F RVW MEDS BY RX/DR IN RCRD: CPT | Mod: CPTII,S$GLB,, | Performed by: ORTHOPAEDIC SURGERY

## 2024-10-06 NOTE — PROGRESS NOTES
Status/Diagnosis: Displaced Left intra-articular 4th toe P1 fracture.  Date of Surgery: none  Date of Injury: 07/15/2024  Return visit: PRN  X-rays on Return: pending patient complaint    Chief Complaint:   Chief Complaint   Patient presents with    Left Foot - Pain     Present History:  Patient presents today via referral from No ref. provider found   Benji Ring is a 68 y.o. female who presents today for new patient evaluation.  Reports stubbing her left 4th toe on a bed frame approximately 3 weeks ago.  Immediate pain and swelling but still able to bear weight.  Delayed presentation but Seen at local ED on 09/02/2024 at which time x-rays were taken and consistent with fracture.  No instructions for ignacio taping but she was placed into a postoperative shoe however this is inappropriately sized.  At least 2 sizes too big.    4/10 pain today.  Denies any numbness or tingling.    Past medical history significant for hypertension and type 2 diabetes, A1c of 8.0 (from 2022) Denies tobacco use.  Remains active.    10/03/2024:  Patient returns today for repeat clinical evaluation and x-ray.  Overall doing well.  1/10 pain.  She is currently full weight-bearing her postoperative she was instructed.  Still with mild-to-moderate persistent swelling.  No new injuries.      Past Medical History:   Diagnosis Date    Breast cancer     Recurrent    Diabetes     Early    Hypertension        Past Surgical History:   Procedure Laterality Date    BILATERAL MASTECTOMY      BREAST RECONSTRUCTION      x2    ENDOSCOPIC ULTRASOUND OF UPPER GASTROINTESTINAL TRACT Left 3/5/2021    Procedure: ULTRASOUND, UPPER GI TRACT, ENDOSCOPIC;  Surgeon: Teja Bass MD;  Location: [facility];  Service: Endoscopy;  Laterality: Left;  GIF plus Linear    ENDOSCOPIC ULTRASOUND OF UPPER GASTROINTESTINAL TRACT Left 3/1/2023    Procedure: ULTRASOUND, UPPER GI TRACT, ENDOSCOPIC;  Surgeon: Teja Bass MD;  Location: [facility];  Service:  Endoscopy;  Laterality: Left;    ESOPHAGOGASTRODUODENOSCOPY N/A 3/5/2021    Procedure: EGD (ESOPHAGOGASTRODUODENOSCOPY);  Surgeon: Teja Bass MD;  Location: [facility];  Service: Endoscopy;  Laterality: N/A;    ESOPHAGOGASTRODUODENOSCOPY N/A 3/1/2023    Procedure: EGD (ESOPHAGOGASTRODUODENOSCOPY);  Surgeon: Teja Bass MD;  Location: [facility];  Service: Endoscopy;  Laterality: N/A;    HYSTERECTOMY      MANDIBLE SURGERY      MASTECTOMY, RADICAL         Current Outpatient Medications   Medication Sig    atenoloL (TENORMIN) 50 MG tablet Take 1 tablet by mouth once daily    atorvastatin (LIPITOR) 10 MG tablet Take 10 mg by mouth once daily.    calcium carbonate (OS-DARI) 500 mg calcium (1,250 mg) tablet Take 2 tablets by mouth once daily.    cholecalciferol, vitamin D3, (VITAMIN D3) 50 mcg (2,000 unit) Cap Take 1 capsule by mouth once daily.    cranberry 400 mg Cap Take 1,000 mg by mouth once daily.    FREESTYLE LIS 2 SENSOR Kit APPLY A NEW SENSOR EVERY 14 DAYS    glimepiride (AMARYL) 4 MG tablet     JARDIANCE 25 mg tablet Take 25 mg by mouth once daily.    Lactobacillus rhamnosus GG (CULTURELLE) 10 billion cell capsule Take 1 capsule by mouth once daily.    losartan (COZAAR) 50 MG tablet Take 1 tablet by mouth once daily    MULTIVITAMIN W-MINERALS/LUTEIN (CENTRUM SILVER ORAL) Take 1 tablet by mouth once daily.    omega-3 fatty acids-vitamin E 1,000 mg Cap Take 2 tablets by mouth before breakfast.    ONETOUCH DELICA PLUS LANCET 30 gauge Misc USE 1 TO CHECK GLUCOSE THREE TIMES DAILY    ONETOUCH VERIO TEST STRIPS Strp 1 strip 3 (three) times daily. To check blood sugar    OZEMPIC 0.25 mg or 0.5 mg(2 mg/1.5 mL) pen injector     paroxetine (PAXIL) 30 MG tablet     phenazopyridine (PYRIDIUM) 200 MG tablet Take 1 tablet (200 mg total) by mouth 3 (three) times daily as needed for Pain.     No current facility-administered medications for this visit.       Review of patient's allergies indicates:   Allergen  Reactions    Meperidine Nausea And Vomiting and Nausea Only    Shingrix (pf) [varicella-zoster [id number] (pf)] Swelling    Demerol (pf) [meperidine (pf)]     Naprosyn [naproxen] Rash       Family History   Problem Relation Name Age of Onset    Cancer Mother      Parkinsonism Father      Cataracts Father      Diabetes Paternal Uncle         Social History     Socioeconomic History    Marital status:    Tobacco Use    Smoking status: Never    Smokeless tobacco: Never   Substance and Sexual Activity    Alcohol use: No    Drug use: No     Social Drivers of Health     Financial Resource Strain: Patient Declined (9/4/2024)    Overall Financial Resource Strain (CARDIA)     Difficulty of Paying Living Expenses: Patient declined   Food Insecurity: Patient Declined (9/4/2024)    Hunger Vital Sign     Worried About Running Out of Food in the Last Year: Patient declined     Ran Out of Food in the Last Year: Patient declined   Physical Activity: Inactive (9/4/2024)    Exercise Vital Sign     Days of Exercise per Week: 0 days     Minutes of Exercise per Session: 0 min   Stress: Stress Concern Present (9/4/2024)    [employer] - Occupational Stress Questionnaire     Feeling of Stress : Rather much   Housing Stability: Unknown (9/4/2024)    Housing Stability Vital Sign     Unable to Pay for Housing in the Last Year: Patient declined       Physical exam:  There were no vitals filed for this visit.  Body mass index is 21.24 kg/m².  General: In no apparent distress; well developed and well nourished.  HEENT: normocephalic; atraumatic.  Cardiovascular: regular rate.  Respiratory: no increased work of breathing.  Musculoskeletal:   Gait: nonantalgic  Inspection:   Mild residual diffuse swelling about the 4th toe with minimal tenderness at the 4th metatarsal base fracture site.  No evidence of malrotation.  Near full 4th MTP joint range of motion with minimal pain only at extremes.  Silfverskiold:  Negative  Alignment:  Knee: neutral               Ankle: neutral              Hindfoot: neutral              Forefoot: neutral   Strength:              Dorsiflexion 5/5  Plantar flexion 5/5  Inversion 5/5  Eversion 5/5  Sensation:              SILT distally  ROM:              Ankle: full and painless              Subtalar: full and painless  Pulses: Palpable pedal pulse                   Imaging Studies/Outside documentation:  I have ordered/reviewed/interpreted the following images/outside documentation:  1. Weight-bearing 3-views of Left foot and ankle:   On my independent review, persistent mildly displaced intra-articular medial capsular avulsion fracture of the 4th metatarsal proximal phalangeal base.  Questionable early bony bridging present.  Diffuse osteopenic changes throughout the foot.  Moderate increased calcaneal pitch.  Talonavicular uncoverage within normal limits.        Assessment:  Benji Ring is a 68 y.o. female with Displaced Left intra-articular 4th toe P1 fracture.     Plan:   Clinical and radiographic findings were discussed.   Minimal bony bridging on repeat x-ray however patient was doing much better from a clinical standpoint.    We discussed the natural history of injury and potential for fibrous union.    No indication for surgical intervention at this time.    Okay to transition out of the postoperative shoe and into a good supportive tennis shoe.  Recommend she refrain from walking barefoot or in flip-flops for at least the next 6-8 weeks.    Still no high impact activities-running, jumping, etc..    Patient voiced understanding.  All questions were answered.  She will follow up on an as-needed basis.    .    This note was created using voice recognition software and may contain grammatical errors.